# Patient Record
Sex: FEMALE | Race: BLACK OR AFRICAN AMERICAN | NOT HISPANIC OR LATINO | URBAN - METROPOLITAN AREA
[De-identification: names, ages, dates, MRNs, and addresses within clinical notes are randomized per-mention and may not be internally consistent; named-entity substitution may affect disease eponyms.]

---

## 2020-12-06 ENCOUNTER — INPATIENT (INPATIENT)
Facility: HOSPITAL | Age: 25
LOS: 5 days | Discharge: HOME | End: 2020-12-12
Attending: INTERNAL MEDICINE | Admitting: INTERNAL MEDICINE
Payer: MEDICAID

## 2020-12-06 ENCOUNTER — TRANSCRIPTION ENCOUNTER (OUTPATIENT)
Age: 25
End: 2020-12-06

## 2020-12-06 VITALS
TEMPERATURE: 99 F | SYSTOLIC BLOOD PRESSURE: 141 MMHG | RESPIRATION RATE: 16 BRPM | DIASTOLIC BLOOD PRESSURE: 83 MMHG | HEART RATE: 123 BPM | OXYGEN SATURATION: 99 %

## 2020-12-06 PROCEDURE — 99285 EMERGENCY DEPT VISIT HI MDM: CPT

## 2020-12-06 RX ORDER — SODIUM CHLORIDE 9 MG/ML
1000 INJECTION INTRAMUSCULAR; INTRAVENOUS; SUBCUTANEOUS ONCE
Refills: 0 | Status: COMPLETED | OUTPATIENT
Start: 2020-12-06 | End: 2020-12-06

## 2020-12-06 RX ADMIN — SODIUM CHLORIDE 1000 MILLILITER(S): 9 INJECTION INTRAMUSCULAR; INTRAVENOUS; SUBCUTANEOUS at 23:22

## 2020-12-06 NOTE — ED PROVIDER NOTE - ATTENDING CONTRIBUTION TO CARE
24 y/o female no pmhx presents with c/o sudden onset llq abdominal pain 3 hrs ago + took her temp and it was 102 so came to the ER, denies any headaches, neck pain, cough, n/v/d, chest pain, sob, any current abdominal pain, urinary symptoms, pelvic pain/discharge, numbness/weakness/tingling, recent travel, sick contact, social history. Took tylenol 2 hours ago with relief of fever.  Pt tachycardic, febrile to 100.2. Labs significant for elevated WBC 19.3, lactate 3.1. UA shows infection. Culture pending. Blood cultures drawn. CXR clear. low suspicion for COVID. Will send to CDU to observe overnight. Plan for abx, IVF, pain and fever control, repeat labs in morning.    I performed a history and physical exam of the patient and discussed their management with the advanced care provider. I reviewed the advanced care provider's note and agree with the documented findings and plan of care. My medical decision making and objective findings are found above.

## 2020-12-06 NOTE — ED ADULT NURSE NOTE - CHIEF COMPLAINT QUOTE
Patient c/o body aches, fevers ([id number]) and chills starting Saturday worsening today. Patient denies any shortness of breath, cough, loss of smell or taste. Patient reports intermittent pain to left lower quadrant. Patient denies any nausea, vomiting, diarrhea. Denies pain or difficulty urinating. LMP 11/18. Denies any past medical history. Last took ibuprofen 8:30pm.

## 2020-12-06 NOTE — ED ADULT NURSE NOTE - OBJECTIVE STATEMENT
Pt. presents to intake room 3, A&OX4, ambulatory. Pt. c/o fevers, bodyaches and chills. Pt. also states he had a sharp pain to the LLQ earlier tonight but the pain is intermittent and ha snow subsided. MD at bedside for evaluation. Respirations appear even and unlabored. 20 G IV established to L AC. Labs drawn and sent. Comfort measures in place. Will continue ot monitor.

## 2020-12-06 NOTE — ED PROVIDER NOTE - CLINICAL SUMMARY MEDICAL DECISION MAKING FREE TEXT BOX
abdominal pain, fever, afebrile, abdominal exam completely wnl, labs ua, ucg, fluids, covid, reasses

## 2020-12-06 NOTE — ED ADULT TRIAGE NOTE - CHIEF COMPLAINT QUOTE
Patient c/o body aches, fevers ([id number]) and chills starting Saturday worsening today. Patient denies any shortness of breath, cough, loss of smell or taste. Patient reports intermittent pain to left lower quadrant. Patient denies any nausea, vomiting, diarrhea. Denies pain or difficulty urinating. LMP 11/18. Denies any past medical history. Last took ibuprofen 8:30pm.

## 2020-12-06 NOTE — ED PROVIDER NOTE - OBJECTIVE STATEMENT
24 y/o female no pmh presents with c/o sudden onset llq abdominal pain 3 hrs ago + took her temp and it was 102 so came to the ER, denies any headaches, neck pain, cough, n/v/d, chest pain, sob, any current abdominal pain, urinary symptoms, pelvic pain/discharge, numbness/weakness/tingling, recent travel, sick contact, social history. Took tylenol 2 hours ago with relief of fever.

## 2020-12-07 DIAGNOSIS — N20.0 CALCULUS OF KIDNEY: ICD-10-CM

## 2020-12-07 LAB
ALBUMIN SERPL ELPH-MCNC: 4.1 G/DL — SIGNIFICANT CHANGE UP (ref 3.3–5)
ALP SERPL-CCNC: 111 U/L — SIGNIFICANT CHANGE UP (ref 40–120)
ALT FLD-CCNC: 19 U/L — SIGNIFICANT CHANGE UP (ref 4–33)
ANION GAP SERPL CALC-SCNC: 12 MMOL/L — SIGNIFICANT CHANGE UP (ref 7–14)
ANION GAP SERPL CALC-SCNC: 14 MMOL/L — SIGNIFICANT CHANGE UP (ref 7–14)
APPEARANCE UR: ABNORMAL
AST SERPL-CCNC: 26 U/L — SIGNIFICANT CHANGE UP (ref 4–32)
B PERT DNA SPEC QL NAA+PROBE: SIGNIFICANT CHANGE UP
BASE EXCESS BLDV CALC-SCNC: -1 MMOL/L — SIGNIFICANT CHANGE UP (ref -3–2)
BASOPHILS # BLD AUTO: 0.03 K/UL — SIGNIFICANT CHANGE UP (ref 0–0.2)
BASOPHILS # BLD AUTO: 0.03 K/UL — SIGNIFICANT CHANGE UP (ref 0–0.2)
BASOPHILS NFR BLD AUTO: 0.1 % — SIGNIFICANT CHANGE UP (ref 0–2)
BASOPHILS NFR BLD AUTO: 0.2 % — SIGNIFICANT CHANGE UP (ref 0–2)
BILIRUB SERPL-MCNC: 0.2 MG/DL — SIGNIFICANT CHANGE UP (ref 0.2–1.2)
BILIRUB UR-MCNC: NEGATIVE — SIGNIFICANT CHANGE UP
BLD GP AB SCN SERPL QL: NEGATIVE — SIGNIFICANT CHANGE UP
BLOOD GAS VENOUS - CREATININE: 1 MG/DL — SIGNIFICANT CHANGE UP (ref 0.5–1.3)
BLOOD GAS VENOUS COMPREHENSIVE RESULT: SIGNIFICANT CHANGE UP
BUN SERPL-MCNC: 10 MG/DL — SIGNIFICANT CHANGE UP (ref 7–23)
BUN SERPL-MCNC: 12 MG/DL — SIGNIFICANT CHANGE UP (ref 7–23)
C PNEUM DNA SPEC QL NAA+PROBE: SIGNIFICANT CHANGE UP
CALCIUM SERPL-MCNC: 8.6 MG/DL — SIGNIFICANT CHANGE UP (ref 8.4–10.5)
CALCIUM SERPL-MCNC: 9.4 MG/DL — SIGNIFICANT CHANGE UP (ref 8.4–10.5)
CHLORIDE BLDV-SCNC: 109 MMOL/L — HIGH (ref 96–108)
CHLORIDE SERPL-SCNC: 100 MMOL/L — SIGNIFICANT CHANGE UP (ref 98–107)
CHLORIDE SERPL-SCNC: 103 MMOL/L — SIGNIFICANT CHANGE UP (ref 98–107)
CO2 SERPL-SCNC: 23 MMOL/L — SIGNIFICANT CHANGE UP (ref 22–31)
CO2 SERPL-SCNC: 24 MMOL/L — SIGNIFICANT CHANGE UP (ref 22–31)
COLOR SPEC: YELLOW — SIGNIFICANT CHANGE UP
CREAT SERPL-MCNC: 0.96 MG/DL — SIGNIFICANT CHANGE UP (ref 0.5–1.3)
CREAT SERPL-MCNC: 0.97 MG/DL — SIGNIFICANT CHANGE UP (ref 0.5–1.3)
DIFF PNL FLD: ABNORMAL
EOSINOPHIL # BLD AUTO: 0 K/UL — SIGNIFICANT CHANGE UP (ref 0–0.5)
EOSINOPHIL # BLD AUTO: 0.02 K/UL — SIGNIFICANT CHANGE UP (ref 0–0.5)
EOSINOPHIL NFR BLD AUTO: 0 % — SIGNIFICANT CHANGE UP (ref 0–6)
EOSINOPHIL NFR BLD AUTO: 0.1 % — SIGNIFICANT CHANGE UP (ref 0–6)
FLUAV H1 2009 PAND RNA SPEC QL NAA+PROBE: SIGNIFICANT CHANGE UP
FLUAV H1 RNA SPEC QL NAA+PROBE: SIGNIFICANT CHANGE UP
FLUAV H3 RNA SPEC QL NAA+PROBE: SIGNIFICANT CHANGE UP
FLUAV SUBTYP SPEC NAA+PROBE: SIGNIFICANT CHANGE UP
FLUBV RNA SPEC QL NAA+PROBE: SIGNIFICANT CHANGE UP
GAS PNL BLDV: 139 MMOL/L — SIGNIFICANT CHANGE UP (ref 136–146)
GLUCOSE BLDV-MCNC: 101 MG/DL — HIGH (ref 70–99)
GLUCOSE SERPL-MCNC: 103 MG/DL — HIGH (ref 70–99)
GLUCOSE SERPL-MCNC: 137 MG/DL — HIGH (ref 70–99)
GLUCOSE UR QL: NEGATIVE — SIGNIFICANT CHANGE UP
HADV DNA SPEC QL NAA+PROBE: SIGNIFICANT CHANGE UP
HCO3 BLDV-SCNC: 22 MMOL/L — SIGNIFICANT CHANGE UP (ref 20–27)
HCOV PNL SPEC NAA+PROBE: SIGNIFICANT CHANGE UP
HCT VFR BLD CALC: 35.2 % — SIGNIFICANT CHANGE UP (ref 34.5–45)
HCT VFR BLD CALC: 38.4 % — SIGNIFICANT CHANGE UP (ref 34.5–45)
HCT VFR BLD CALC: 41 % — SIGNIFICANT CHANGE UP (ref 34.5–45)
HCT VFR BLDA CALC: 39.6 % — SIGNIFICANT CHANGE UP (ref 34.5–46.5)
HGB BLD CALC-MCNC: 12.9 G/DL — SIGNIFICANT CHANGE UP (ref 11.5–15.5)
HGB BLD-MCNC: 11 G/DL — LOW (ref 11.5–15.5)
HGB BLD-MCNC: 11.9 G/DL — SIGNIFICANT CHANGE UP (ref 11.5–15.5)
HGB BLD-MCNC: 12.6 G/DL — SIGNIFICANT CHANGE UP (ref 11.5–15.5)
HMPV RNA SPEC QL NAA+PROBE: SIGNIFICANT CHANGE UP
HPIV1 RNA SPEC QL NAA+PROBE: SIGNIFICANT CHANGE UP
HPIV2 RNA SPEC QL NAA+PROBE: SIGNIFICANT CHANGE UP
HPIV3 RNA SPEC QL NAA+PROBE: SIGNIFICANT CHANGE UP
HPIV4 RNA SPEC QL NAA+PROBE: SIGNIFICANT CHANGE UP
IANC: 16.52 K/UL — HIGH (ref 1.5–8.5)
IANC: 21.55 K/UL — HIGH (ref 1.5–8.5)
IMM GRANULOCYTES NFR BLD AUTO: 0.5 % — SIGNIFICANT CHANGE UP (ref 0–1.5)
IMM GRANULOCYTES NFR BLD AUTO: 0.7 % — SIGNIFICANT CHANGE UP (ref 0–1.5)
INR BLD: 1.51 RATIO — HIGH (ref 0.88–1.17)
KETONES UR-MCNC: NEGATIVE — SIGNIFICANT CHANGE UP
LACTATE BLDV-MCNC: 2.1 MMOL/L — HIGH (ref 0.5–2)
LACTATE BLDV-MCNC: 2.8 MMOL/L — HIGH (ref 0.5–2)
LEUKOCYTE ESTERASE UR-ACNC: ABNORMAL
LYMPHOCYTES # BLD AUTO: 1.2 K/UL — SIGNIFICANT CHANGE UP (ref 1–3.3)
LYMPHOCYTES # BLD AUTO: 1.22 K/UL — SIGNIFICANT CHANGE UP (ref 1–3.3)
LYMPHOCYTES # BLD AUTO: 4.8 % — LOW (ref 13–44)
LYMPHOCYTES # BLD AUTO: 6.3 % — LOW (ref 13–44)
MCHC RBC-ENTMCNC: 27.2 PG — SIGNIFICANT CHANGE UP (ref 27–34)
MCHC RBC-ENTMCNC: 27.4 PG — SIGNIFICANT CHANGE UP (ref 27–34)
MCHC RBC-ENTMCNC: 27.6 PG — SIGNIFICANT CHANGE UP (ref 27–34)
MCHC RBC-ENTMCNC: 30.7 GM/DL — LOW (ref 32–36)
MCHC RBC-ENTMCNC: 31 GM/DL — LOW (ref 32–36)
MCHC RBC-ENTMCNC: 31.3 GM/DL — LOW (ref 32–36)
MCV RBC AUTO: 88.4 FL — SIGNIFICANT CHANGE UP (ref 80–100)
MCV RBC AUTO: 88.5 FL — SIGNIFICANT CHANGE UP (ref 80–100)
MCV RBC AUTO: 88.6 FL — SIGNIFICANT CHANGE UP (ref 80–100)
MONOCYTES # BLD AUTO: 1.46 K/UL — HIGH (ref 0–0.9)
MONOCYTES # BLD AUTO: 1.84 K/UL — HIGH (ref 0–0.9)
MONOCYTES NFR BLD AUTO: 7.4 % — SIGNIFICANT CHANGE UP (ref 2–14)
MONOCYTES NFR BLD AUTO: 7.5 % — SIGNIFICANT CHANGE UP (ref 2–14)
NEUTROPHILS # BLD AUTO: 16.52 K/UL — HIGH (ref 1.8–7.4)
NEUTROPHILS # BLD AUTO: 21.55 K/UL — HIGH (ref 1.8–7.4)
NEUTROPHILS NFR BLD AUTO: 85.4 % — HIGH (ref 43–77)
NEUTROPHILS NFR BLD AUTO: 87 % — HIGH (ref 43–77)
NITRITE UR-MCNC: NEGATIVE — SIGNIFICANT CHANGE UP
NRBC # BLD: 0 /100 WBCS — SIGNIFICANT CHANGE UP
NRBC # FLD: 0 K/UL — SIGNIFICANT CHANGE UP
PCO2 BLDV: 52 MMHG — HIGH (ref 41–51)
PH BLDV: 7.3 — LOW (ref 7.32–7.43)
PH UR: 7.5 — SIGNIFICANT CHANGE UP (ref 5–8)
PLATELET # BLD AUTO: 263 K/UL — SIGNIFICANT CHANGE UP (ref 150–400)
PLATELET # BLD AUTO: 278 K/UL — SIGNIFICANT CHANGE UP (ref 150–400)
PLATELET # BLD AUTO: 314 K/UL — SIGNIFICANT CHANGE UP (ref 150–400)
PO2 BLDV: 30 MMHG — LOW (ref 35–40)
POTASSIUM BLDV-SCNC: 3.6 MMOL/L — SIGNIFICANT CHANGE UP (ref 3.4–4.5)
POTASSIUM SERPL-MCNC: 3.7 MMOL/L — SIGNIFICANT CHANGE UP (ref 3.5–5.3)
POTASSIUM SERPL-MCNC: 3.7 MMOL/L — SIGNIFICANT CHANGE UP (ref 3.5–5.3)
POTASSIUM SERPL-SCNC: 3.7 MMOL/L — SIGNIFICANT CHANGE UP (ref 3.5–5.3)
POTASSIUM SERPL-SCNC: 3.7 MMOL/L — SIGNIFICANT CHANGE UP (ref 3.5–5.3)
PROT SERPL-MCNC: 7.1 G/DL — SIGNIFICANT CHANGE UP (ref 6–8.3)
PROT UR-MCNC: ABNORMAL
PROTHROM AB SERPL-ACNC: 16.9 SEC — HIGH (ref 9.8–13.1)
RAPID RVP RESULT: SIGNIFICANT CHANGE UP
RBC # BLD: 3.98 M/UL — SIGNIFICANT CHANGE UP (ref 3.8–5.2)
RBC # BLD: 4.34 M/UL — SIGNIFICANT CHANGE UP (ref 3.8–5.2)
RBC # BLD: 4.63 M/UL — SIGNIFICANT CHANGE UP (ref 3.8–5.2)
RBC # FLD: 12.4 % — SIGNIFICANT CHANGE UP (ref 10.3–14.5)
RBC # FLD: 12.6 % — SIGNIFICANT CHANGE UP (ref 10.3–14.5)
RBC # FLD: 12.8 % — SIGNIFICANT CHANGE UP (ref 10.3–14.5)
RH IG SCN BLD-IMP: POSITIVE — SIGNIFICANT CHANGE UP
RSV RNA SPEC QL NAA+PROBE: SIGNIFICANT CHANGE UP
RV+EV RNA SPEC QL NAA+PROBE: SIGNIFICANT CHANGE UP
SAO2 % BLDV: 41.4 % — LOW (ref 60–85)
SARS-COV-2 RNA SPEC QL NAA+PROBE: SIGNIFICANT CHANGE UP
SARS-COV-2 RNA SPEC QL NAA+PROBE: SIGNIFICANT CHANGE UP
SODIUM SERPL-SCNC: 137 MMOL/L — SIGNIFICANT CHANGE UP (ref 135–145)
SODIUM SERPL-SCNC: 139 MMOL/L — SIGNIFICANT CHANGE UP (ref 135–145)
SP GR SPEC: 1.02 — SIGNIFICANT CHANGE UP (ref 1.01–1.02)
UROBILINOGEN FLD QL: SIGNIFICANT CHANGE UP
WBC # BLD: 19.34 K/UL — HIGH (ref 3.8–10.5)
WBC # BLD: 24.8 K/UL — HIGH (ref 3.8–10.5)
WBC # BLD: 31.16 K/UL — HIGH (ref 3.8–10.5)
WBC # FLD AUTO: 19.34 K/UL — HIGH (ref 3.8–10.5)
WBC # FLD AUTO: 24.8 K/UL — HIGH (ref 3.8–10.5)
WBC # FLD AUTO: 31.16 K/UL — HIGH (ref 3.8–10.5)

## 2020-12-07 PROCEDURE — 52332 CYSTOSCOPY AND TREATMENT: CPT | Mod: LT

## 2020-12-07 PROCEDURE — 74177 CT ABD & PELVIS W/CONTRAST: CPT | Mod: 26

## 2020-12-07 PROCEDURE — 71046 X-RAY EXAM CHEST 2 VIEWS: CPT | Mod: 26

## 2020-12-07 PROCEDURE — 99236 HOSP IP/OBS SAME DATE HI 85: CPT

## 2020-12-07 PROCEDURE — 93010 ELECTROCARDIOGRAM REPORT: CPT

## 2020-12-07 RX ORDER — ACETAMINOPHEN 500 MG
650 TABLET ORAL ONCE
Refills: 0 | Status: COMPLETED | OUTPATIENT
Start: 2020-12-07 | End: 2020-12-07

## 2020-12-07 RX ORDER — ATROPA BELLADONNA AND OPIUM 16.2; 6 MG/1; MG/1
1 SUPPOSITORY RECTAL ONCE
Refills: 0 | Status: DISCONTINUED | OUTPATIENT
Start: 2020-12-07 | End: 2020-12-07

## 2020-12-07 RX ORDER — VANCOMYCIN HCL 1 G
1000 VIAL (EA) INTRAVENOUS ONCE
Refills: 0 | Status: COMPLETED | OUTPATIENT
Start: 2020-12-07 | End: 2020-12-07

## 2020-12-07 RX ORDER — PIPERACILLIN AND TAZOBACTAM 4; .5 G/20ML; G/20ML
3.38 INJECTION, POWDER, LYOPHILIZED, FOR SOLUTION INTRAVENOUS ONCE
Refills: 0 | Status: COMPLETED | OUTPATIENT
Start: 2020-12-07 | End: 2020-12-07

## 2020-12-07 RX ORDER — FENTANYL CITRATE 50 UG/ML
25 INJECTION INTRAVENOUS
Refills: 0 | Status: DISCONTINUED | OUTPATIENT
Start: 2020-12-07 | End: 2020-12-07

## 2020-12-07 RX ORDER — SENNA PLUS 8.6 MG/1
2 TABLET ORAL AT BEDTIME
Refills: 0 | Status: DISCONTINUED | OUTPATIENT
Start: 2020-12-07 | End: 2020-12-12

## 2020-12-07 RX ORDER — ONDANSETRON 8 MG/1
4 TABLET, FILM COATED ORAL ONCE
Refills: 0 | Status: DISCONTINUED | OUTPATIENT
Start: 2020-12-07 | End: 2020-12-07

## 2020-12-07 RX ORDER — POLYETHYLENE GLYCOL 3350 17 G/17G
17 POWDER, FOR SOLUTION ORAL DAILY
Refills: 0 | Status: DISCONTINUED | OUTPATIENT
Start: 2020-12-07 | End: 2020-12-12

## 2020-12-07 RX ORDER — KETOROLAC TROMETHAMINE 30 MG/ML
30 SYRINGE (ML) INJECTION EVERY 6 HOURS
Refills: 0 | Status: DISCONTINUED | OUTPATIENT
Start: 2020-12-07 | End: 2020-12-07

## 2020-12-07 RX ORDER — ACETAMINOPHEN 500 MG
975 TABLET ORAL EVERY 6 HOURS
Refills: 0 | Status: DISCONTINUED | OUTPATIENT
Start: 2020-12-07 | End: 2020-12-07

## 2020-12-07 RX ORDER — SODIUM CHLORIDE 9 MG/ML
1000 INJECTION INTRAMUSCULAR; INTRAVENOUS; SUBCUTANEOUS
Refills: 0 | Status: DISCONTINUED | OUTPATIENT
Start: 2020-12-07 | End: 2020-12-07

## 2020-12-07 RX ORDER — CEFTRIAXONE 500 MG/1
1000 INJECTION, POWDER, FOR SOLUTION INTRAMUSCULAR; INTRAVENOUS ONCE
Refills: 0 | Status: COMPLETED | OUTPATIENT
Start: 2020-12-07 | End: 2020-12-07

## 2020-12-07 RX ORDER — CEFTRIAXONE 500 MG/1
1000 INJECTION, POWDER, FOR SOLUTION INTRAMUSCULAR; INTRAVENOUS EVERY 24 HOURS
Refills: 0 | Status: DISCONTINUED | OUTPATIENT
Start: 2020-12-07 | End: 2020-12-08

## 2020-12-07 RX ORDER — ACETAMINOPHEN 500 MG
650 TABLET ORAL EVERY 6 HOURS
Refills: 0 | Status: DISCONTINUED | OUTPATIENT
Start: 2020-12-07 | End: 2020-12-12

## 2020-12-07 RX ORDER — TAMSULOSIN HYDROCHLORIDE 0.4 MG/1
0.4 CAPSULE ORAL AT BEDTIME
Refills: 0 | Status: DISCONTINUED | OUTPATIENT
Start: 2020-12-07 | End: 2020-12-12

## 2020-12-07 RX ORDER — ONDANSETRON 8 MG/1
4 TABLET, FILM COATED ORAL ONCE
Refills: 0 | Status: COMPLETED | OUTPATIENT
Start: 2020-12-07 | End: 2020-12-07

## 2020-12-07 RX ORDER — HEPARIN SODIUM 5000 [USP'U]/ML
5000 INJECTION INTRAVENOUS; SUBCUTANEOUS EVERY 8 HOURS
Refills: 0 | Status: DISCONTINUED | OUTPATIENT
Start: 2020-12-07 | End: 2020-12-12

## 2020-12-07 RX ORDER — CEFTRIAXONE 500 MG/1
1000 INJECTION, POWDER, FOR SOLUTION INTRAMUSCULAR; INTRAVENOUS EVERY 24 HOURS
Refills: 0 | Status: DISCONTINUED | OUTPATIENT
Start: 2020-12-07 | End: 2020-12-07

## 2020-12-07 RX ORDER — SODIUM CHLORIDE 9 MG/ML
1000 INJECTION INTRAMUSCULAR; INTRAVENOUS; SUBCUTANEOUS ONCE
Refills: 0 | Status: COMPLETED | OUTPATIENT
Start: 2020-12-07 | End: 2020-12-07

## 2020-12-07 RX ORDER — SODIUM CHLORIDE 9 MG/ML
1000 INJECTION, SOLUTION INTRAVENOUS
Refills: 0 | Status: DISCONTINUED | OUTPATIENT
Start: 2020-12-07 | End: 2020-12-08

## 2020-12-07 RX ADMIN — ATROPA BELLADONNA AND OPIUM 1 SUPPOSITORY(S): 16.2; 6 SUPPOSITORY RECTAL at 22:30

## 2020-12-07 RX ADMIN — ONDANSETRON 4 MILLIGRAM(S): 8 TABLET, FILM COATED ORAL at 07:30

## 2020-12-07 RX ADMIN — Medication 30 MILLIGRAM(S): at 10:53

## 2020-12-07 RX ADMIN — HEPARIN SODIUM 5000 UNIT(S): 5000 INJECTION INTRAVENOUS; SUBCUTANEOUS at 21:51

## 2020-12-07 RX ADMIN — Medication 650 MILLIGRAM(S): at 02:01

## 2020-12-07 RX ADMIN — CEFTRIAXONE 100 MILLIGRAM(S): 500 INJECTION, POWDER, FOR SOLUTION INTRAMUSCULAR; INTRAVENOUS at 17:51

## 2020-12-07 RX ADMIN — ATROPA BELLADONNA AND OPIUM 1 SUPPOSITORY(S): 16.2; 6 SUPPOSITORY RECTAL at 21:51

## 2020-12-07 RX ADMIN — PIPERACILLIN AND TAZOBACTAM 200 GRAM(S): 4; .5 INJECTION, POWDER, LYOPHILIZED, FOR SOLUTION INTRAVENOUS at 14:49

## 2020-12-07 RX ADMIN — SENNA PLUS 2 TABLET(S): 8.6 TABLET ORAL at 21:51

## 2020-12-07 RX ADMIN — Medication 250 MILLIGRAM(S): at 15:30

## 2020-12-07 RX ADMIN — TAMSULOSIN HYDROCHLORIDE 0.4 MILLIGRAM(S): 0.4 CAPSULE ORAL at 21:50

## 2020-12-07 RX ADMIN — Medication 975 MILLIGRAM(S): at 09:10

## 2020-12-07 RX ADMIN — Medication 650 MILLIGRAM(S): at 20:59

## 2020-12-07 RX ADMIN — SODIUM CHLORIDE 1000 MILLILITER(S): 9 INJECTION INTRAMUSCULAR; INTRAVENOUS; SUBCUTANEOUS at 01:09

## 2020-12-07 RX ADMIN — SODIUM CHLORIDE 1000 MILLILITER(S): 9 INJECTION INTRAMUSCULAR; INTRAVENOUS; SUBCUTANEOUS at 14:48

## 2020-12-07 RX ADMIN — SODIUM CHLORIDE 150 MILLILITER(S): 9 INJECTION INTRAMUSCULAR; INTRAVENOUS; SUBCUTANEOUS at 05:33

## 2020-12-07 RX ADMIN — SODIUM CHLORIDE 150 MILLILITER(S): 9 INJECTION INTRAMUSCULAR; INTRAVENOUS; SUBCUTANEOUS at 12:00

## 2020-12-07 RX ADMIN — Medication 650 MILLIGRAM(S): at 21:40

## 2020-12-07 RX ADMIN — CEFTRIAXONE 100 MILLIGRAM(S): 500 INJECTION, POWDER, FOR SOLUTION INTRAMUSCULAR; INTRAVENOUS at 01:42

## 2020-12-07 RX ADMIN — Medication 650 MILLIGRAM(S): at 05:33

## 2020-12-07 RX ADMIN — Medication 650 MILLIGRAM(S): at 13:11

## 2020-12-07 NOTE — ED CDU PROVIDER INITIAL DAY NOTE - OBJECTIVE STATEMENT
26 y/o female no pmh presents with c/o sudden onset llq abdominal pain 3 hrs ago + took her temp and it was 102 so came to the ER, denies any headaches, neck pain, cough, n/v/d, chest pain, sob, any current abdominal pain, urinary symptoms, pelvic pain/discharge, numbness/weakness/tingling, recent travel, sick contact, social history. Took tylenol 2 hours ago with relief of fever.  Pt tachycardic, febrile to 100.2. Labs significant for elevated WBC 19.3, lactate 3.1. UA shows infection. Culture pending. Blood cultures drawn. CXR clear. low suspicion for COVID. Likely pyelonephritis. Will observe overnight in CDU. Plan for abx, IVF, pain and fever control, repeat labs in morning. 24 y/o female no pmh presents with c/o sudden onset llq abdominal pain 3 hrs ago + took her temp and it was 102 so came to the ER, denies any headaches, neck pain, cough, n/v/d, chest pain, sob, any current abdominal pain, urinary symptoms, pelvic pain/discharge, numbness/weakness/tingling, recent travel, sick contact, social history. Took tylenol 2 hours ago with relief of fever.  Pt tachycardic, febrile to 100.2. Labs significant for elevated WBC 19.3, lactate 3.1. UA shows infection. Culture pending. Blood cultures drawn. CXR clear. low suspicion for COVID. Will observe overnight in CDU. Plan for abx, IVF, pain and fever control, repeat labs in morning.

## 2020-12-07 NOTE — ED CDU PROVIDER INITIAL DAY NOTE - MEDICAL DECISION MAKING DETAILS
UTI likely pyelonephritis.   plan  - abx, ivf, anti-pyretics, pain control, repeat labs. UTI c/b sepsis.   plan  - abx, ivf, anti-pyretics, pain control, repeat labs.

## 2020-12-07 NOTE — ED CDU PROVIDER INITIAL DAY NOTE - FAMILY HISTORY
Aunt  Still living? Yes, Estimated age: 41-50  Family history of tonsillitis, Age at diagnosis: Age Unknown

## 2020-12-07 NOTE — PROGRESS NOTE ADULT - SUBJECTIVE AND OBJECTIVE BOX
Subjective  Patient is complaining of back pain, no nausea, no vomiting    Objective    Vital signs  T(F): , Max: 102.2 (12-07-20 @ 14:30)  HR: 109 (12-07-20 @ 19:30)  BP: 111/71 (12-07-20 @ 19:30)  SpO2: 96% (12-07-20 @ 19:30)  Wt(kg): --    Output     12-06 @ 07:01  -  12-07 @ 07:00  --------------------------------------------------------  IN: 2000 mL / OUT: 0 mL / NET: 2000 mL    12-07 @ 07:01  -  12-07 @ 20:54  --------------------------------------------------------  IN: 2145 mL / OUT: 235 mL / NET: 1910 mL        Gen: No distress observed  Abd: soft, no CVA tenderness  : + shaffer, clear urine    Labs      12-07 @ 13:39    WBC 31.16 / Hct 35.2  / SCr --       12-07 @ 06:45    WBC 24.80 / Hct 38.4  / SCr 0.96       Urine Cx: ?  Blood Cx: ?    Imaging

## 2020-12-07 NOTE — ED CDU PROVIDER INITIAL DAY NOTE - PROGRESS NOTE DETAILS
SHLOMO Mosley: pt resting comfortably in bed NAD, pt states she feels better, denies abdominal pain.  Abdomen is soft non tender, pt receiving IVF, IV antibiotics, repeat labs. PT well appearing, mentating and denies pain complaints, states she feels well.  Abdomen soft, without any tenderness.  No TTP to bilateral CVA/bilateral lumbar triangles.  Pt states that current sxs began with sharp pain to lower abdomen and now cramping pain.  Despite non-tender exam, pt tachycardic with recurrent fevers, elevating WBC.  Will obtain CTAP to confirm she does not have divertic behind bladder/ruptured appy.  Saline bolus administered following recurrent tachycardia.  CT scan ordered on paper order 2/2 partial sunrise downtime.  PT amenable to plan. SHLOMO Mosley: CT obtained showing "Left-sided obstructive uropathy with mild to moderate left hydroureteronephrosis to the level of a 6 mm mid ureteral stone." Urology called for consult and admission.  Pt receiving IVF, Vancomycin and Zosyn ordered.  Will admit. SHLOMO Mosley: CT obtained showing "Left-sided obstructive uropathy with mild to moderate left hydroureteronephrosis to the level of a 6 mm mid ureteral stone." Urology called for consult and admission.  Pt receiving IVF, Vancomycin and Zosyn ordered.  Pt denies any pain, denies hx of stone.  Pt resting comfortably NAD.  Will admit. SHLOMO Mosley: pt seen and evaluated by Urology advised to admit to Urology Dr. Fields.  Pt advised of plan for admission.

## 2020-12-07 NOTE — H&P ADULT - HISTORY OF PRESENT ILLNESS
Ms. Adams is a 25yof with no significant PMH who presented with fever and LLQ abdominal pain.  Pain began on Saturday with sudden onset.  It was sharp in intensity and associated with nausea but no vomiting.  Yesterday evening additionally developed fever and for this went to the emergency room.  Upon presentation, no longer was experiencing pain.  UA demosntrated large leuks and >50 WBC's, but no nitrites.  WBC was 19.34.  Cr of 0.97, Lactate of 3.1.  Folowing AM pt became tachycardic with worsening leukocytosis and so a CTAP was obtained, demonstrating a 7mm left midureteral stone.

## 2020-12-07 NOTE — H&P ADULT - ASSESSMENT
25yof with a septic left kidney stone.    -NPO IVF  - S/p 4L in boluses total  - Receiving Vanc/zosyn  - Urine and blood cx pending  - Plan for OR emergently

## 2020-12-07 NOTE — ED CDU PROVIDER DISPOSITION NOTE - ATTENDING CONTRIBUTION TO CARE
Attending MD Haile.  Pt seen by me in CDU/obs unit on morning of 12/7.  Pt is a 24 yo female with no sig pmhx who has developed intermittent sharp lower abdominal pain assoc with fevers/chills.  Pt was concerned she had COVID.  PT has a benign abdominal exam.  Pt well appearing and appears non-toxic.  Pt found to have a UTI.  WBC 19-->24.  Pt having fevers in ED.  COVID/RVP negative as well as CXR.  Pt denies vaginal discharge and had a non-actionable pelvic exam on arrival to ED.  Denies travel or known sick contacts.  UA c/w UTI though pt denies dysuria or inc urinary frequency.  Pt very well appearing but WBC elevating.  PT received single dose ceftriaxone so far.  Abdominal exam benign without focal TTP/flank discomfort. Planned continued obs in CDU and repeat WBC/VBG at noon to assess for improvement.  Temp 100.1 this morning.  Pt endorsing improvement in abdominal discomfort and states that she currently has no pain and feels well.  Labs not corresponding with pt appearance.  Will continue to improvement to confirm they begin to resolve. Attending MD Haile.  Pt seen by me in CDU/obs unit on morning of 12/7.  Pt is a 24 yo female with no sig pmhx who has developed intermittent sharp lower abdominal pain assoc with fevers/chills.  Pt was concerned she had COVID.  PT has a benign abdominal exam.  Pt well appearing and appears non-toxic.  Pt found to have a UTI.  WBC 19-->24.  Pt having fevers in ED.  COVID/RVP negative as well as CXR.  Pt denies vaginal discharge and had a non-actionable pelvic exam on arrival to ED.  Denies travel or known sick contacts.  UA c/w UTI though pt denies dysuria or inc urinary frequency.  Pt very well appearing but WBC elevating.  PT received single dose ceftriaxone so far.  Abdominal exam benign without focal TTP/flank discomfort. Planned continued obs in CDU and repeat WBC/VBG at noon to assess for improvement.  Temp 100.1 this morning.  Pt endorsing improvement in abdominal discomfort and states that she currently has no pain and feels well.  Labs not corresponding with pt appearance.  Will continue to improvement to confirm they begin to resolve.    Pt's WBC has continued to rise and pt has continued to spike fevers despite UA c/w UTI and appropriate abx for uncomplicated UTI in otherwise healthy young woman.  Fluids have been bolused and administered as maintenance.  Pt continues to deny pain, denies sig sxs but has intermittent rigors.  She is well appearing and otherwise mentating in NAD.  CTAP ordered to assess for underlying more complicated pathology than is readily apparent on exam.  Pt broadened to broad spectrum abxs for broader sepsis coverage in case source is more complicated than simple UTI.  Pt found to have infected L mid ureteral stone.  Urology called for consult and to take pt to OR.  Pt made NPO.  Pt admitted for infected L ureteral stone with bill sepsis.

## 2020-12-08 DIAGNOSIS — A41.9 SEPSIS, UNSPECIFIED ORGANISM: ICD-10-CM

## 2020-12-08 DIAGNOSIS — N39.0 URINARY TRACT INFECTION, SITE NOT SPECIFIED: ICD-10-CM

## 2020-12-08 LAB
ANION GAP SERPL CALC-SCNC: 11 MMOL/L — SIGNIFICANT CHANGE UP (ref 7–14)
BUN SERPL-MCNC: 9 MG/DL — SIGNIFICANT CHANGE UP (ref 7–23)
CALCIUM SERPL-MCNC: 8.2 MG/DL — LOW (ref 8.4–10.5)
CHLORIDE SERPL-SCNC: 106 MMOL/L — SIGNIFICANT CHANGE UP (ref 98–107)
CO2 SERPL-SCNC: 21 MMOL/L — LOW (ref 22–31)
CREAT SERPL-MCNC: 0.82 MG/DL — SIGNIFICANT CHANGE UP (ref 0.5–1.3)
GLUCOSE SERPL-MCNC: 111 MG/DL — HIGH (ref 70–99)
HCT VFR BLD CALC: 34.4 % — LOW (ref 34.5–45)
HGB BLD-MCNC: 10.6 G/DL — LOW (ref 11.5–15.5)
LACTATE SERPL-SCNC: 3.4 MMOL/L — HIGH (ref 0.5–2)
MCHC RBC-ENTMCNC: 27.4 PG — SIGNIFICANT CHANGE UP (ref 27–34)
MCHC RBC-ENTMCNC: 30.8 GM/DL — LOW (ref 32–36)
MCV RBC AUTO: 88.9 FL — SIGNIFICANT CHANGE UP (ref 80–100)
NRBC # BLD: 0 /100 WBCS — SIGNIFICANT CHANGE UP
NRBC # FLD: 0 K/UL — SIGNIFICANT CHANGE UP
PLATELET # BLD AUTO: 245 K/UL — SIGNIFICANT CHANGE UP (ref 150–400)
POTASSIUM SERPL-MCNC: 3.8 MMOL/L — SIGNIFICANT CHANGE UP (ref 3.5–5.3)
POTASSIUM SERPL-SCNC: 3.8 MMOL/L — SIGNIFICANT CHANGE UP (ref 3.5–5.3)
RBC # BLD: 3.87 M/UL — SIGNIFICANT CHANGE UP (ref 3.8–5.2)
RBC # FLD: 13.1 % — SIGNIFICANT CHANGE UP (ref 10.3–14.5)
SARS-COV-2 RNA SPEC QL NAA+PROBE: SIGNIFICANT CHANGE UP
SODIUM SERPL-SCNC: 138 MMOL/L — SIGNIFICANT CHANGE UP (ref 135–145)
WBC # BLD: 31.81 K/UL — HIGH (ref 3.8–10.5)
WBC # FLD AUTO: 31.81 K/UL — HIGH (ref 3.8–10.5)

## 2020-12-08 PROCEDURE — 71275 CT ANGIOGRAPHY CHEST: CPT | Mod: 26

## 2020-12-08 PROCEDURE — 99223 1ST HOSP IP/OBS HIGH 75: CPT

## 2020-12-08 RX ORDER — MEROPENEM 1 G/30ML
1000 INJECTION INTRAVENOUS EVERY 8 HOURS
Refills: 0 | Status: DISCONTINUED | OUTPATIENT
Start: 2020-12-08 | End: 2020-12-09

## 2020-12-08 RX ORDER — INFLUENZA VIRUS VACCINE 15; 15; 15; 15 UG/.5ML; UG/.5ML; UG/.5ML; UG/.5ML
0.5 SUSPENSION INTRAMUSCULAR ONCE
Refills: 0 | Status: DISCONTINUED | OUTPATIENT
Start: 2020-12-08 | End: 2020-12-12

## 2020-12-08 RX ORDER — MEROPENEM 1 G/30ML
1000 INJECTION INTRAVENOUS ONCE
Refills: 0 | Status: COMPLETED | OUTPATIENT
Start: 2020-12-08 | End: 2020-12-08

## 2020-12-08 RX ORDER — IBUPROFEN 200 MG
400 TABLET ORAL ONCE
Refills: 0 | Status: COMPLETED | OUTPATIENT
Start: 2020-12-08 | End: 2020-12-08

## 2020-12-08 RX ORDER — SODIUM CHLORIDE 9 MG/ML
1000 INJECTION, SOLUTION INTRAVENOUS
Refills: 0 | Status: DISCONTINUED | OUTPATIENT
Start: 2020-12-08 | End: 2020-12-10

## 2020-12-08 RX ORDER — MEROPENEM 1 G/30ML
INJECTION INTRAVENOUS
Refills: 0 | Status: DISCONTINUED | OUTPATIENT
Start: 2020-12-08 | End: 2020-12-09

## 2020-12-08 RX ORDER — SODIUM CHLORIDE 9 MG/ML
1000 INJECTION, SOLUTION INTRAVENOUS ONCE
Refills: 0 | Status: COMPLETED | OUTPATIENT
Start: 2020-12-08 | End: 2020-12-08

## 2020-12-08 RX ORDER — SODIUM CHLORIDE 9 MG/ML
1000 INJECTION, SOLUTION INTRAVENOUS
Refills: 0 | Status: DISCONTINUED | OUTPATIENT
Start: 2020-12-08 | End: 2020-12-08

## 2020-12-08 RX ORDER — PANTOPRAZOLE SODIUM 20 MG/1
40 TABLET, DELAYED RELEASE ORAL
Refills: 0 | Status: DISCONTINUED | OUTPATIENT
Start: 2020-12-08 | End: 2020-12-12

## 2020-12-08 RX ADMIN — Medication 650 MILLIGRAM(S): at 23:27

## 2020-12-08 RX ADMIN — MEROPENEM 100 MILLIGRAM(S): 1 INJECTION INTRAVENOUS at 22:08

## 2020-12-08 RX ADMIN — PANTOPRAZOLE SODIUM 40 MILLIGRAM(S): 20 TABLET, DELAYED RELEASE ORAL at 08:40

## 2020-12-08 RX ADMIN — HEPARIN SODIUM 5000 UNIT(S): 5000 INJECTION INTRAVENOUS; SUBCUTANEOUS at 13:08

## 2020-12-08 RX ADMIN — SODIUM CHLORIDE 125 MILLILITER(S): 9 INJECTION, SOLUTION INTRAVENOUS at 22:09

## 2020-12-08 RX ADMIN — MEROPENEM 100 MILLIGRAM(S): 1 INJECTION INTRAVENOUS at 14:07

## 2020-12-08 RX ADMIN — TAMSULOSIN HYDROCHLORIDE 0.4 MILLIGRAM(S): 0.4 CAPSULE ORAL at 22:09

## 2020-12-08 RX ADMIN — Medication 650 MILLIGRAM(S): at 05:05

## 2020-12-08 RX ADMIN — HEPARIN SODIUM 5000 UNIT(S): 5000 INJECTION INTRAVENOUS; SUBCUTANEOUS at 05:05

## 2020-12-08 RX ADMIN — HEPARIN SODIUM 5000 UNIT(S): 5000 INJECTION INTRAVENOUS; SUBCUTANEOUS at 22:09

## 2020-12-08 RX ADMIN — SODIUM CHLORIDE 125 MILLILITER(S): 9 INJECTION, SOLUTION INTRAVENOUS at 17:56

## 2020-12-08 RX ADMIN — SODIUM CHLORIDE 1000 MILLILITER(S): 9 INJECTION, SOLUTION INTRAVENOUS at 13:08

## 2020-12-08 RX ADMIN — Medication 650 MILLIGRAM(S): at 06:00

## 2020-12-08 RX ADMIN — POLYETHYLENE GLYCOL 3350 17 GRAM(S): 17 POWDER, FOR SOLUTION ORAL at 12:04

## 2020-12-08 RX ADMIN — Medication 400 MILLIGRAM(S): at 09:27

## 2020-12-08 RX ADMIN — Medication 650 MILLIGRAM(S): at 11:30

## 2020-12-08 RX ADMIN — SODIUM CHLORIDE 75 MILLILITER(S): 9 INJECTION, SOLUTION INTRAVENOUS at 09:27

## 2020-12-08 NOTE — PROVIDER CONTACT NOTE (OTHER) - ACTION/TREATMENT ORDERED:
SHLOMO Harrell made aware of pt. complaint. Pt. placed on  & incentive spirometer use reinforced & encouraged. No further orders at this time. Will continue to monitor.

## 2020-12-08 NOTE — PROVIDER CONTACT NOTE (OTHER) - ASSESSMENT
Pt. V/S presented w/ tachycardia & O2 sat. of 88% on RA; placed on 3L NC. Pt. c/o stabbing pain in mediastinal chest area; denies any SOB or radiation of pain. Pt stated that she was having this pain in the ED before surgery & in PACU; also stated that she made PACU nurses aware of pain.

## 2020-12-08 NOTE — PROGRESS NOTE ADULT - SUBJECTIVE AND OBJECTIVE BOX
ANESTHESIA POSTOP CHECK    25y Female POSTOP DAY 1     No COMPLAINTS    NO APPARENT ANESTHESIA COMPLICATIONS

## 2020-12-08 NOTE — CONSULT NOTE ADULT - SUBJECTIVE AND OBJECTIVE BOX
Patient is a 25y old  Female who presents with a chief complaint of fever    HPI:  25F no significant PMH who presented with fever and LLQ abdominal pain.  Pain began on Saturday with sudden onset.  It was sharp in intensity and associated with nausea but no vomiting.  Yesterday evening additionally developed fever and for this went to the emergency room.  Upon presentation, no longer was experiencing pain.  UA demosntrated large leuks and >50 WBC's, but no nitrites.  WBC was 19.34.  Cr of 0.97, Lactate of 3.1.  Folowing AM pt became tachycardic with worsening leukocytosis and so a CTAP was obtained, demonstrating a 7mm left midureteral stone.     Allergies:  No Known Allergies    HOME MEDICATIONS:  none    MEDICATIONS  (STANDING):  cefTRIAXone   IVPB 1000 milliGRAM(s) IV Intermittent every 24 hours  dextrose 5% + sodium chloride 0.45%. 1000 milliLiter(s) (75 mL/Hr) IV Continuous <Continuous>  heparin   Injectable 5000 Unit(s) SubCutaneous every 8 hours  influenza   Vaccine 0.5 milliLiter(s) IntraMuscular once  pantoprazole    Tablet 40 milliGRAM(s) Oral before breakfast  polyethylene glycol 3350 17 Gram(s) Oral daily  senna 2 Tablet(s) Oral at bedtime  tamsulosin 0.4 milliGRAM(s) Oral at bedtime    MEDICATIONS  (PRN):  acetaminophen   Tablet .. 650 milliGRAM(s) Oral every 6 hours PRN Temp greater or equal to 38C (100.4F), Mild Pain (1 - 3)    PAST MEDICAL & SURGICAL HISTORY:  No pertinent past medical history  tonsilitis, s/p surgery    SOCIAL HISTORY:  lives with parents, working from home since October  no tob/alcohol/drugs  works for [employer]    FAMILY HISTORY:  Family history of tonsillitis (Aunt)  mother -   father -     REVIEW OF SYSTEMS:    CONSTITUTIONAL: No fever, weight loss, or fatigue  EYES: No eye pain, visual disturbances, or discharge  ENMT:  No difficulty hearing, tinnitus, vertigo; No sinus or throat pain  NECK: No pain or stiffness  BREASTS: No pain, masses, or nipple discharge  RESPIRATORY: No cough, wheezing, chills or hemoptysis; No shortness of breath  CARDIOVASCULAR: No chest pain, palpitations, dizziness, or leg swelling  GASTROINTESTINAL: No abdominal or epigastric pain. No nausea, vomiting, or hematemesis; No diarrhea or constipation. No melena or hematochezia.  GENITOURINARY: No dysuria, frequency, hematuria, or incontinence  NEUROLOGICAL: No headaches, memory loss, loss of strength, numbness, or tremors  SKIN: No itching, burning, rashes, or lesions   LYMPH NODES: No enlarged glands  ENDOCRINE: No heat or cold intolerance; No hair loss  MUSCULOSKELETAL: No muscle or back pain  PSYCHIATRIC: No depression, anxiety, mood swings, or difficulty sleeping  HEME/LYMPH: No easy bruising, or bleeding gums  ALLERGY AND IMMUNOLOGIC: No hives or eczema  [  ] All other ROS negative  [  ] Unable to obtain due to poor mental status    Vital Signs Last 24 Hrs  T(C): 39.2 (08 Dec 2020 10:25), Max: 39.4 (08 Dec 2020 08:37)  T(F): 102.6 (08 Dec 2020 10:25), Max: 103 (08 Dec 2020 08:37)  HR: 130 (08 Dec 2020 10:25) (99 - 130)  BP: 100/60 (08 Dec 2020 10:25) (100/60 - 124/74)  BP(mean): 82 (07 Dec 2020 23:00) (72 - 85)  RR: 24 (08 Dec 2020 10:25) (12 - 28)  SpO2: 98% (08 Dec 2020 10:25) (92% - 100%)    PHYSICAL EXAM:  GENERAL: sitting up in bed, +rigors  HEAD:  Atraumatic, Normocephalic  EYES: EOMI, PERRLA, conjunctiva and sclera clear  ENMT: Moist mucous membranes  NECK: Supple, No JVD  RESPIRATORY: Clear to auscultation bilaterally; No rales, rhonchi, wheezing, or rubs  CARDIOVASCULAR: Regular rate and rhythm; No murmurs, rubs, or gallops  GASTROINTESTINAL: Soft, Nontender, Nondistended; Bowel sounds present  GENITOURINARY: +shaffer  EXTREMITIES:  2+ Peripheral Pulses, No clubbing, cyanosis, or edema  NERVOUS SYSTEM:  Alert & Oriented X3; Moving all 4 extremities; No gross sensory deficits  HEME/LYMPH: No lymphadenopathy noted  SKIN: No rashes or lesions  PSYCH: calm, appropriate    LABS:                        10.6   31.81 )-----------( 245      ( 08 Dec 2020 07:20 )             34.4     12-08    138  |  106  |  9   ----------------------------<  111<H>  3.8   |  21<L>  |  0.82    Ca    8.2<L>      08 Dec 2020 06:51    TPro  7.1  /  Alb  4.1  /  TBili  0.2  /  DBili  x   /  AST  26  /  ALT  19  /  AlkPhos  111  12-07    PT/INR - ( 07 Dec 2020 15:37 )   PT: 16.9 sec;   INR: 1.51 ratio      Urinalysis Basic - ( 07 Dec 2020 01:07 )  Color: Yellow / Appearance: Slightly Turbid / S.024 / pH: x  Gluc: x / Ketone: Negative  / Bili: Negative / Urobili: <2 mg/dL   Blood: x / Protein: 30 mg/dL / Nitrite: Negative   Leuk Esterase: Large / RBC: 3-5 /HPF / WBC >50 /HPF   Sq Epi: x / Non Sq Epi: Few / Bacteria: Many    RADIOLOGY & ADDITIONAL STUDIES:    EKG:   Personally Reviewed:  [ ] YES     Imaging:   Personally Reviewed:  [ ] YES               Consultant(s) notes reviewed:    Care Discussed with Consultant(s)/Other Providers: urology re overall care Patient is a 25y old  Female who presents with a chief complaint of fever    HPI:  25F no significant PMH who presented with fever and LLQ abdominal pain.  Pain began on Saturday with sudden onset.  It was sharp in intensity and associated with nausea but no vomiting.  Yesterday evening additionally developed fever and for this went to the emergency room.  Upon presentation, no longer was experiencing pain.  UA demosntrated large leuks and >50 WBC's, but no nitrites.  WBC was 19.34. Cr of 0.97, Lactate of 3.1.  Folowing AM pt became tachycardic with worsening leukocytosis, CTAP showed 7mm left midureteral stone.   Over night developed     Allergies:  No Known Allergies    HOME MEDICATIONS:  none    MEDICATIONS  (STANDING):  cefTRIAXone   IVPB 1000 milliGRAM(s) IV Intermittent every 24 hours  dextrose 5% + sodium chloride 0.45%. 1000 milliLiter(s) (75 mL/Hr) IV Continuous <Continuous>  heparin   Injectable 5000 Unit(s) SubCutaneous every 8 hours  influenza   Vaccine 0.5 milliLiter(s) IntraMuscular once  pantoprazole    Tablet 40 milliGRAM(s) Oral before breakfast  polyethylene glycol 3350 17 Gram(s) Oral daily  senna 2 Tablet(s) Oral at bedtime  tamsulosin 0.4 milliGRAM(s) Oral at bedtime    MEDICATIONS  (PRN):  acetaminophen   Tablet .. 650 milliGRAM(s) Oral every 6 hours PRN Temp greater or equal to 38C (100.4F), Mild Pain (1 - 3)    PAST MEDICAL & SURGICAL HISTORY:  No pertinent past medical history  tonsilitis, s/p surgery    SOCIAL HISTORY:  lives with parents, working from home since October  no tob/alcohol/drugs  works for [employer]    FAMILY HISTORY:  Family history of tonsillitis (Aunt)  mother -   father -     REVIEW OF SYSTEMS:    CONSTITUTIONAL: No fever, weight loss, or fatigue  EYES: No eye pain, visual disturbances, or discharge  ENMT:  No difficulty hearing, tinnitus, vertigo; No sinus or throat pain  NECK: No pain or stiffness  BREASTS: No pain, masses, or nipple discharge  RESPIRATORY: No cough, wheezing, chills or hemoptysis; No shortness of breath  CARDIOVASCULAR: No chest pain, palpitations, dizziness, or leg swelling  GASTROINTESTINAL: No abdominal or epigastric pain. No nausea, vomiting, or hematemesis; No diarrhea or constipation. No melena or hematochezia.  GENITOURINARY: No dysuria, frequency, hematuria, or incontinence  NEUROLOGICAL: No headaches, memory loss, loss of strength, numbness, or tremors  SKIN: No itching, burning, rashes, or lesions   LYMPH NODES: No enlarged glands  ENDOCRINE: No heat or cold intolerance; No hair loss  MUSCULOSKELETAL: No muscle or back pain  PSYCHIATRIC: No depression, anxiety, mood swings, or difficulty sleeping  HEME/LYMPH: No easy bruising, or bleeding gums  ALLERGY AND IMMUNOLOGIC: No hives or eczema  [  ] All other ROS negative  [  ] Unable to obtain due to poor mental status    Vital Signs Last 24 Hrs  T(C): 39.2 (08 Dec 2020 10:25), Max: 39.4 (08 Dec 2020 08:37)  T(F): 102.6 (08 Dec 2020 10:25), Max: 103 (08 Dec 2020 08:37)  HR: 130 (08 Dec 2020 10:25) (99 - 130)  BP: 100/60 (08 Dec 2020 10:25) (100/60 - 124/74)  BP(mean): 82 (07 Dec 2020 23:00) (72 - 85)  RR: 24 (08 Dec 2020 10:25) (12 - 28)  SpO2: 98% (08 Dec 2020 10:25) (92% - 100%)    PHYSICAL EXAM:  GENERAL: sitting up in bed, +rigors  HEAD:  Atraumatic, Normocephalic  EYES: EOMI, PERRLA, conjunctiva and sclera clear  ENMT: Moist mucous membranes  NECK: Supple, No JVD  RESPIRATORY: Clear to auscultation bilaterally; No rales, rhonchi, wheezing, or rubs  CARDIOVASCULAR: Regular rate and rhythm; No murmurs, rubs, or gallops  GASTROINTESTINAL: Soft, Nontender, Nondistended; Bowel sounds present  GENITOURINARY: +shaffer  EXTREMITIES:  2+ Peripheral Pulses, No clubbing, cyanosis, or edema  NERVOUS SYSTEM:  Alert & Oriented X3; Moving all 4 extremities; No gross sensory deficits  HEME/LYMPH: No lymphadenopathy noted  SKIN: No rashes or lesions  PSYCH: calm, appropriate    LABS:                        10.6   31.81 )-----------( 245      ( 08 Dec 2020 07:20 )             34.4     12-08    138  |  106  |  9   ----------------------------<  111<H>  3.8   |  21<L>  |  0.82    Ca    8.2<L>      08 Dec 2020 06:51    TPro  7.1  /  Alb  4.1  /  TBili  0.2  /  DBili  x   /  AST  26  /  ALT  19  /  AlkPhos  111  12-07    PT/INR - ( 07 Dec 2020 15:37 )   PT: 16.9 sec;   INR: 1.51 ratio      Urinalysis Basic - ( 07 Dec 2020 01:07 )  Color: Yellow / Appearance: Slightly Turbid / S.024 / pH: x  Gluc: x / Ketone: Negative  / Bili: Negative / Urobili: <2 mg/dL   Blood: x / Protein: 30 mg/dL / Nitrite: Negative   Leuk Esterase: Large / RBC: 3-5 /HPF / WBC >50 /HPF   Sq Epi: x / Non Sq Epi: Few / Bacteria: Many    RADIOLOGY & ADDITIONAL STUDIES:    EKG:   Personally Reviewed:  [ ] YES     Imaging:   Personally Reviewed:  [ ] YES               Consultant(s) notes reviewed:    Care Discussed with Consultant(s)/Other Providers: urology re overall care Patient is a 25y old  Female who presents with a chief complaint of fever    HPI:  25F no significant PMH who presented with fever and LLQ abdominal pain.  Pain began on Saturday with sudden onset.  It was sharp in intensity and associated with nausea but no vomiting.  Yesterday evening additionally developed fever and for this went to the emergency room.  Upon presentation, no longer was experiencing pain.  UA demosntrated large leuks and >50 WBC's, but no nitrites.  WBC was 19.34. Cr of 0.97, Lactate of 3.1.  Folowing AM pt became tachycardic with worsening leukocytosis, CTAP showed 7mm left midureteral stone. 20 s/p cysto with placement of L ureteral stent, some purulence from L UO. Developed hypoxia 88% RA, tachycardia, c/o stabbing pain in mid chest area.  Seen earlier this am, still with fever, tachycardia, cough with some phlegm, mild SOB.  No pain.    Working from home, no known COVID exposures.  Allergies:  No Known Allergies    HOME MEDICATIONS:  none    MEDICATIONS  (STANDING):  cefTRIAXone   IVPB 1000 milliGRAM(s) IV Intermittent every 24 hours  dextrose 5% + sodium chloride 0.45%. 1000 milliLiter(s) (75 mL/Hr) IV Continuous <Continuous>  heparin   Injectable 5000 Unit(s) SubCutaneous every 8 hours  influenza   Vaccine 0.5 milliLiter(s) IntraMuscular once  pantoprazole    Tablet 40 milliGRAM(s) Oral before breakfast  polyethylene glycol 3350 17 Gram(s) Oral daily  senna 2 Tablet(s) Oral at bedtime  tamsulosin 0.4 milliGRAM(s) Oral at bedtime    MEDICATIONS  (PRN):  acetaminophen   Tablet .. 650 milliGRAM(s) Oral every 6 hours PRN Temp greater or equal to 38C (100.4F), Mild Pain (1 - 3)    PAST MEDICAL & SURGICAL HISTORY:  No pertinent past medical history  tonsilitis, s/p surgery    SOCIAL HISTORY:  lives with parents, working from home since October  no tob/alcohol/drugs  works for [employer]    FAMILY HISTORY:  Family history of tonsillitis (Aunt)  no significant FH    REVIEW OF SYSTEMS:  CONSTITUTIONAL: per HPI  EYES: No eye pain, visual disturbances, or discharge  ENMT:  No difficulty hearing, tinnitus, vertigo; No sinus or throat pain  NECK: No pain or stiffness  BREASTS: No pain, masses, or nipple discharge  RESPIRATORY: per HPI  CARDIOVASCULAR: No palpitations, dizziness, or leg swelling  GASTROINTESTINAL: per HPI No nausea, vomiting, or hematemesis; No diarrhea or constipation. No melena or hematochezia.  GENITOURINARY: No dysuria, frequency, hematuria, or incontinence  NEUROLOGICAL: No headaches, memory loss, loss of strength, numbness, or tremors  SKIN: No itching, burning, rashes, or lesions   LYMPH NODES: No enlarged glands  ENDOCRINE: No heat or cold intolerance; No hair loss  MUSCULOSKELETAL: No muscle or back pain  PSYCHIATRIC: No depression, anxiety, mood swings, or difficulty sleeping  HEME/LYMPH: No easy bruising, or bleeding gums  ALLERGY AND IMMUNOLOGIC: No hives or eczema  [  ] All other ROS negative  [  ] Unable to obtain due to poor mental status    Vital Signs Last 24 Hrs  T(C): 39.2 (08 Dec 2020 10:25), Max: 39.4 (08 Dec 2020 08:37)  T(F): 102.6 (08 Dec 2020 10:25), Max: 103 (08 Dec 2020 08:37)  HR: 130 (08 Dec 2020 10:25) (99 - 130)  BP: 100/60 (08 Dec 2020 10:25) (100/60 - 124/74)  BP(mean): 82 (07 Dec 2020 23:00) (72 - 85)  RR: 24 (08 Dec 2020 10:25) (12 - 28)  SpO2: 98% (08 Dec 2020 10:25) (92% - 100%)    PHYSICAL EXAM:  GENERAL: sitting up in bed, +rigors   HEAD:  Atraumatic, Normocephalic  EYES: EOMI, PERRLA, conjunctiva and sclera clear  ENMT: Moist mucous membranes  NECK: Supple, No JVD  RESPIRATORY: dec BS bases  CARDIOVASCULAR: Regular rate and rhythm; No murmurs, rubs, or gallops  GASTROINTESTINAL: Soft, Nontender, Nondistended; Bowel sounds present  GENITOURINARY: +shaffer  EXTREMITIES:  2+ Peripheral Pulses, No clubbing, cyanosis, or edema  NERVOUS SYSTEM:  Alert & Oriented X3; Moving all 4 extremities; No gross sensory deficits  HEME/LYMPH: No lymphadenopathy noted  SKIN: No rashes or lesions  PSYCH: calm, appropriate    LABS:                        10.6   31.81 )-----------( 245      ( 08 Dec 2020 07:20 )             34.4     12-08    138  |  106  |  9   ----------------------------<  111<H>  3.8   |  21<L>  |  0.82    Ca    8.2<L>      08 Dec 2020 06:51    TPro  7.1  /  Alb  4.1  /  TBili  0.2  /  DBili  x   /  AST  26  /  ALT  19  /  AlkPhos  111  12-07    PT/INR - ( 07 Dec 2020 15:37 )   PT: 16.9 sec;   INR: 1.51 ratio      Urinalysis Basic - ( 07 Dec 2020 01:07 )  Color: Yellow / Appearance: Slightly Turbid / S.024 / pH: x  Gluc: x / Ketone: Negative  / Bili: Negative / Urobili: <2 mg/dL   Blood: x / Protein: 30 mg/dL / Nitrite: Negative   Leuk Esterase: Large / RBC: 3-5 /HPF / WBC >50 /HPF   Sq Epi: x / Non Sq Epi: Few / Bacteria: Many    RADIOLOGY & ADDITIONAL STUDIES:    EKG:   Personally Reviewed:  [ ] YES     Imaging:   Personally Reviewed:  [ ] YES               Consultant(s) notes reviewed:    Care Discussed with Consultant(s)/Other Providers: urology re overall care

## 2020-12-08 NOTE — CONSULT NOTE ADULT - ASSESSMENT
25F no sig PMH, a/w severe sepsis from UTI, found to have Left-sided obstructive uropathy with mild to moderate left hydroureteronephrosis to the level of a 6 mm mid ureteral stone, s/p L ureteral stent with purulence from UO.  Persistently febrile, tachycardic, developed hypoxia, elevated lactate

## 2020-12-08 NOTE — PROGRESS NOTE ADULT - ASSESSMENT
POD#1 s/p L. 7mm mid uret stone; stent placed; WBC 31  Plan:  - monitor VS  - OOB  - IS  - check cultures  - cont ceftriaxone  - leave in shaffer for now

## 2020-12-08 NOTE — CONSULT NOTE ADULT - PROBLEM SELECTOR RECOMMENDATION 9
Left-sided obstructive uropathy with mild to moderate left hydroureteronephrosis to the level of a 6 mm mid ureteral stone, s/p L ureteral stent with purulence from UO on 12/7/20  - urine growing >100,000 GNR - would expand coverage to meropenem, f/u urine/blood cultures

## 2020-12-08 NOTE — PROGRESS NOTE ADULT - SUBJECTIVE AND OBJECTIVE BOX
POD #1  Tmax 100.8  5pm yesterday  Afeb 107/60 100's  96% 1 L NC  Pt has c/o "chest discomfort" and needing to catch breath  She appears comfortable, no SOB, in NAD  Abd- soft NT ND   Stanley 1L

## 2020-12-08 NOTE — CONSULT NOTE ADULT - PROBLEM SELECTOR RECOMMENDATION 2
severe sepsis s/p fluid resuscitation in ED, lactate normalized, recurrent fever, tachycardia, developed hypoxia, lactate elevated 3.4 - rebolus, broaden abx coverage to raul, f/u VS closely, low threshold for ICU evaluation  hypoxia seems most c/w severe sepsis  - team checking CTA  - COVID negative in ED, repeat pending, isolation for now

## 2020-12-09 LAB
-  AMIKACIN: SIGNIFICANT CHANGE UP
-  AMOXICILLIN/CLAVULANIC ACID: SIGNIFICANT CHANGE UP
-  AMPICILLIN/SULBACTAM: SIGNIFICANT CHANGE UP
-  AMPICILLIN: SIGNIFICANT CHANGE UP
-  AZTREONAM: SIGNIFICANT CHANGE UP
-  CEFAZOLIN: SIGNIFICANT CHANGE UP
-  CEFEPIME: SIGNIFICANT CHANGE UP
-  CEFOXITIN: SIGNIFICANT CHANGE UP
-  CEFTRIAXONE: SIGNIFICANT CHANGE UP
-  CIPROFLOXACIN: SIGNIFICANT CHANGE UP
-  ERTAPENEM: SIGNIFICANT CHANGE UP
-  GENTAMICIN: SIGNIFICANT CHANGE UP
-  LEVOFLOXACIN: SIGNIFICANT CHANGE UP
-  MEROPENEM: SIGNIFICANT CHANGE UP
-  NITROFURANTOIN: SIGNIFICANT CHANGE UP
-  PIPERACILLIN/TAZOBACTAM: SIGNIFICANT CHANGE UP
-  TOBRAMYCIN: SIGNIFICANT CHANGE UP
-  TRIMETHOPRIM/SULFAMETHOXAZOLE: SIGNIFICANT CHANGE UP
ANION GAP SERPL CALC-SCNC: 9 MMOL/L — SIGNIFICANT CHANGE UP (ref 7–14)
BUN SERPL-MCNC: 8 MG/DL — SIGNIFICANT CHANGE UP (ref 7–23)
CALCIUM SERPL-MCNC: 8.4 MG/DL — SIGNIFICANT CHANGE UP (ref 8.4–10.5)
CHLORIDE SERPL-SCNC: 104 MMOL/L — SIGNIFICANT CHANGE UP (ref 98–107)
CO2 SERPL-SCNC: 25 MMOL/L — SIGNIFICANT CHANGE UP (ref 22–31)
CREAT SERPL-MCNC: 0.85 MG/DL — SIGNIFICANT CHANGE UP (ref 0.5–1.3)
CULTURE RESULTS: NO GROWTH — SIGNIFICANT CHANGE UP
CULTURE RESULTS: SIGNIFICANT CHANGE UP
CULTURE RESULTS: SIGNIFICANT CHANGE UP
GLUCOSE SERPL-MCNC: 87 MG/DL — SIGNIFICANT CHANGE UP (ref 70–99)
HCT VFR BLD CALC: 33 % — LOW (ref 34.5–45)
HGB BLD-MCNC: 10.7 G/DL — LOW (ref 11.5–15.5)
LACTATE SERPL-SCNC: 1.2 MMOL/L — SIGNIFICANT CHANGE UP (ref 0.5–2)
MCHC RBC-ENTMCNC: 28.1 PG — SIGNIFICANT CHANGE UP (ref 27–34)
MCHC RBC-ENTMCNC: 32.4 GM/DL — SIGNIFICANT CHANGE UP (ref 32–36)
MCV RBC AUTO: 86.6 FL — SIGNIFICANT CHANGE UP (ref 80–100)
METHOD TYPE: SIGNIFICANT CHANGE UP
NRBC # BLD: 0 /100 WBCS — SIGNIFICANT CHANGE UP
NRBC # FLD: 0 K/UL — SIGNIFICANT CHANGE UP
ORGANISM # SPEC MICROSCOPIC CNT: SIGNIFICANT CHANGE UP
ORGANISM # SPEC MICROSCOPIC CNT: SIGNIFICANT CHANGE UP
PLATELET # BLD AUTO: 255 K/UL — SIGNIFICANT CHANGE UP (ref 150–400)
POTASSIUM SERPL-MCNC: 3.6 MMOL/L — SIGNIFICANT CHANGE UP (ref 3.5–5.3)
POTASSIUM SERPL-SCNC: 3.6 MMOL/L — SIGNIFICANT CHANGE UP (ref 3.5–5.3)
RBC # BLD: 3.81 M/UL — SIGNIFICANT CHANGE UP (ref 3.8–5.2)
RBC # FLD: 13.1 % — SIGNIFICANT CHANGE UP (ref 10.3–14.5)
SODIUM SERPL-SCNC: 138 MMOL/L — SIGNIFICANT CHANGE UP (ref 135–145)
SPECIMEN SOURCE: SIGNIFICANT CHANGE UP
WBC # BLD: 26.42 K/UL — HIGH (ref 3.8–10.5)
WBC # FLD AUTO: 26.42 K/UL — HIGH (ref 3.8–10.5)

## 2020-12-09 PROCEDURE — 99233 SBSQ HOSP IP/OBS HIGH 50: CPT

## 2020-12-09 RX ORDER — CEFTRIAXONE 500 MG/1
1000 INJECTION, POWDER, FOR SOLUTION INTRAMUSCULAR; INTRAVENOUS EVERY 24 HOURS
Refills: 0 | Status: DISCONTINUED | OUTPATIENT
Start: 2020-12-10 | End: 2020-12-10

## 2020-12-09 RX ORDER — IBUPROFEN 200 MG
600 TABLET ORAL ONCE
Refills: 0 | Status: COMPLETED | OUTPATIENT
Start: 2020-12-09 | End: 2020-12-09

## 2020-12-09 RX ORDER — CEFTRIAXONE 500 MG/1
INJECTION, POWDER, FOR SOLUTION INTRAMUSCULAR; INTRAVENOUS
Refills: 0 | Status: DISCONTINUED | OUTPATIENT
Start: 2020-12-09 | End: 2020-12-10

## 2020-12-09 RX ORDER — CEFTRIAXONE 500 MG/1
1000 INJECTION, POWDER, FOR SOLUTION INTRAMUSCULAR; INTRAVENOUS ONCE
Refills: 0 | Status: COMPLETED | OUTPATIENT
Start: 2020-12-09 | End: 2020-12-09

## 2020-12-09 RX ADMIN — Medication 650 MILLIGRAM(S): at 00:25

## 2020-12-09 RX ADMIN — HEPARIN SODIUM 5000 UNIT(S): 5000 INJECTION INTRAVENOUS; SUBCUTANEOUS at 22:15

## 2020-12-09 RX ADMIN — Medication 650 MILLIGRAM(S): at 14:41

## 2020-12-09 RX ADMIN — HEPARIN SODIUM 5000 UNIT(S): 5000 INJECTION INTRAVENOUS; SUBCUTANEOUS at 05:15

## 2020-12-09 RX ADMIN — CEFTRIAXONE 100 MILLIGRAM(S): 500 INJECTION, POWDER, FOR SOLUTION INTRAMUSCULAR; INTRAVENOUS at 14:40

## 2020-12-09 RX ADMIN — Medication 650 MILLIGRAM(S): at 15:40

## 2020-12-09 RX ADMIN — MEROPENEM 100 MILLIGRAM(S): 1 INJECTION INTRAVENOUS at 09:03

## 2020-12-09 RX ADMIN — Medication 650 MILLIGRAM(S): at 22:34

## 2020-12-09 RX ADMIN — Medication 600 MILLIGRAM(S): at 00:30

## 2020-12-09 RX ADMIN — HEPARIN SODIUM 5000 UNIT(S): 5000 INJECTION INTRAVENOUS; SUBCUTANEOUS at 14:40

## 2020-12-09 RX ADMIN — TAMSULOSIN HYDROCHLORIDE 0.4 MILLIGRAM(S): 0.4 CAPSULE ORAL at 22:15

## 2020-12-09 RX ADMIN — Medication 650 MILLIGRAM(S): at 22:14

## 2020-12-09 RX ADMIN — PANTOPRAZOLE SODIUM 40 MILLIGRAM(S): 20 TABLET, DELAYED RELEASE ORAL at 05:15

## 2020-12-09 NOTE — PROGRESS NOTE ADULT - ASSESSMENT
25F no PMH w/7mm left mid ureteral stone s/p L stent placement; febrile to 103 yesterday    Plan:  -- pt  w/SOB, CP, yesterday - CTangio negative  -- broadened to meropenem, UCx growing Proteus, await sensitivities  -- TOV w/afebrile >24hrs  -- IS/OOB  -- appreciate hospitalist recs

## 2020-12-09 NOTE — PROGRESS NOTE ADULT - PROBLEM SELECTOR PLAN 1
Left-sided obstructive uropathy with mild to moderate left hydroureteronephrosis to the level of a 6 mm mid ureteral stone, s/p L ureteral stent with purulence from UO on 12/7/20  - urine growing >100,000 Proteus, can deescalate to ceftriaxone, f/u urine/blood cultures.

## 2020-12-09 NOTE — PROGRESS NOTE ADULT - PROBLEM SELECTOR PLAN 2
severe sepsis - improving, changing to CTX, c/w IVFs  hypoxia c/w severe sepsis - CTA no PE, +bilateral patchy opacities  - COVID negative x3

## 2020-12-09 NOTE — PROGRESS NOTE ADULT - ASSESSMENT
25F no PMH w/7mm left mid ureteral stone s/p L stent placement  -- pt doing, w/SOB, CP, yesterday - CTangio negative  -- broadened to meropenem, UCx growing Proteus, await sensitivities  -- TOV w/afebrile >24hrs  -- IS/OOB  -- appreciate hospitalist recs

## 2020-12-09 NOTE — PROGRESS NOTE ADULT - ASSESSMENT
25F no sig PMH, a/w severe sepsis from UTI, found to have Left-sided obstructive uropathy with mild to moderate left hydroureteronephrosis to the level of a 6 mm mid ureteral stone, s/p L ureteral stent with purulence from UO.  Persistently febrile, tachycardic, developed hypoxia, elevated lactate --> defervescing, sepsis improving, urine +Proteus

## 2020-12-09 NOTE — PROGRESS NOTE ADULT - SUBJECTIVE AND OBJECTIVE BOX
POD #2  Tmax 102  100.4  110/72 107 99%2L  Pt has no new  c/o; some feeling of SOB  Covid neg X3; CT angio - no P.E.  Abd- soft NT ND   Stanley 1500

## 2020-12-09 NOTE — PROGRESS NOTE ADULT - SUBJECTIVE AND OBJECTIVE BOX
[facility] Division of Hospital Medicine  Gilda Kessler MD  Pager (M-F, 8A-5P):  In-house pager [phone]; Long-range pager [phone]  Other Times:  Please page Hospitalist in Charge -  In-house pager [phone]    Patient is a 25y old  Female who presents with a chief complaint of septic L kidney stone (08 Dec 2020 11:48)    SUBJECTIVE / OVERNIGHT EVENTS: Didn't feel well because of fever and body aches last night.  Cough with phlegm.    ADDITIONAL REVIEW OF SYSTEMS:    MEDICATIONS  (STANDING):  cefTRIAXone   IVPB      cefTRIAXone   IVPB 1000 milliGRAM(s) IV Intermittent once  heparin   Injectable 5000 Unit(s) SubCutaneous every 8 hours  influenza   Vaccine 0.5 milliLiter(s) IntraMuscular once  lactated ringers. 1000 milliLiter(s) (125 mL/Hr) IV Continuous <Continuous>  pantoprazole    Tablet 40 milliGRAM(s) Oral before breakfast  polyethylene glycol 3350 17 Gram(s) Oral daily  senna 2 Tablet(s) Oral at bedtime  tamsulosin 0.4 milliGRAM(s) Oral at bedtime    MEDICATIONS  (PRN):  acetaminophen   Tablet .. 650 milliGRAM(s) Oral every 6 hours PRN Temp greater or equal to 38C (100.4F), Mild Pain (1 - 3)    I&O's Summary    08 Dec 2020 07:01  -  09 Dec 2020 07:00  --------------------------------------------------------  IN: 0 mL / OUT: 3700 mL / NET: -3700 mL    09 Dec 2020 07:01  -  09 Dec 2020 12:51  --------------------------------------------------------  IN: 0 mL / OUT: 1000 mL / NET: -1000 mL    PHYSICAL EXAM:  Vital Signs Last 24 Hrs  T(C): 37.5 (09 Dec 2020 09:44), Max: 39 (09 Dec 2020 01:10)  T(F): 99.5 (09 Dec 2020 09:44), Max: 102.2 (09 Dec 2020 01:10)  HR: 113 (09 Dec 2020 09:44) (83 - 113)  BP: 118/75 (09 Dec 2020 09:44) (110/72 - 121/73)  BP(mean): --  RR: 20 (09 Dec 2020 09:44) (20 - 20)  SpO2: 98% (09 Dec 2020 11:24) (94% - 100%)    GENERAL: sitting up in bed, looks tired  RESPIRATORY: dec BS/rhonci   CARDIOVASCULAR: Regular rate and rhythm; No murmurs, rubs, or gallops  GASTROINTESTINAL: Soft, Nontender, Nondistended; Bowel sounds present  GENITOURINARY: shaffer has been removed  EXTREMITIES:  2+ Peripheral Pulses, No clubbing, cyanosis, or edema  NERVOUS SYSTEM: nonfocal  SKIN: No rashes or lesions  PSYCH: calm, appropriate    LABS:                        10.7   26.42 )-----------( 255      ( 09 Dec 2020 07:40 )             33.0     12-09    138  |  104  |  8   ----------------------------<  87  3.6   |  25  |  0.85    Ca    8.4      09 Dec 2020 07:40    PT/INR - ( 07 Dec 2020 15:37 )   PT: 16.9 sec;   INR: 1.51 ratio      Culture - Urine (collected 08 Dec 2020 08:50)  Source: .Urine Clean Catch (Midstream)  Final Report (09 Dec 2020 09:27):    No growth    Culture - Urine (collected 08 Dec 2020 00:45)  Source: Kidney LEFT KIDNEY URINE CULTURE  Preliminary Report (08 Dec 2020 21:30):    No growth to date.    Culture - Blood (collected 07 Dec 2020 06:59)  Source: .Blood Blood-Venous  Preliminary Report (08 Dec 2020 07:01):    No growth to date.    Culture - Blood (collected 07 Dec 2020 06:56)  Source: .Blood Blood-Peripheral  Preliminary Report (08 Dec 2020 07:01):    No growth to date.    Culture - Urine (collected 06 Dec 2020 23:15)  Source: .Urine Clean Catch (Midstream)  Final Report (09 Dec 2020 11:06):    >100,000 CFU/ml Proteus mirabilis    <10,000 CFU/ml Normal Urogenital madison present  Organism: Proteus mirabilis (09 Dec 2020 11:06)  Organism: Proteus mirabilis (09 Dec 2020 11:06)    RADIOLOGY & ADDITIONAL TESTS:  < from: CT Angio Chest w/ IV Cont (12.08.20 @ 18:01) >  LUNGS AND AIRWAYS: Patent central airways.  Patchy opacities are present within the right middle lobe, left upper and both lower lobes, more so within the left lower lobe. The findings are new when compared to CT scan of the abdomen of 12/7/2020.  PLEURA: No pleural effusion.  MEDIASTINUM AND NOLBERTO: No lymphadenopathy.  VESSELS: Pulmonary artery is normal in caliber. No filling defects are noted. Left vertebral artery originates off the aortic arch, common normal variant.  HEART: Heart size is normal. No pericardial effusion.  CHEST WALL AND LOWER NECK: Within normal limits.  VISUALIZED UPPER ABDOMEN: The high attenuation lesion lesion measuring 1.8 x 2.2 cm is noted in the liver. For complete interpretation of the abdominal contents, please see the report of the CT scan of the abdomen performed on 12/7/2020. Left ureteral stent.  BONES: Within normal limits.    IMPRESSION:    No pulmonary embolism.    Patchy opacities are present within the right middle lobe, left upper lobe and both lower lobes. The findings are new when compared to study of 12/7/2020. Exact etiology is unclear.    2.2 cm high attenuation lesion is noted within the liver. MRI of the liver is with intravenous contrast is recommended for further evaluation.    Results Reviewed:   Imaging Personally Reviewed:  Electrocardiogram Personally Reviewed:    COORDINATION OF CARE:  Care Discussed with Consultants/Other Providers [Y/N]: urology re overall care  Prior or Outpatient Records Reviewed [Y/N]:

## 2020-12-09 NOTE — PROGRESS NOTE ADULT - SUBJECTIVE AND OBJECTIVE BOX
Subjective  Remains febrile overnight, Tmax 103, last febrile 100.4 @2am. Pt w/some persistent SOB.    Objective    Vital signs  T(F): , Max: 103 (12-08-20 @ 08:37)  HR: 108 (12-09-20 @ 05:09)  BP: 110/72 (12-09-20 @ 05:09)  SpO2: 94% (12-09-20 @ 05:09)  Wt(kg): --    Output     12-07 @ 07:01  -  12-08 @ 07:00  --------------------------------------------------------  IN: 3020 mL / OUT: 1295 mL / NET: 1725 mL    12-08 @ 07:01  -  12-09 @ 06:12  --------------------------------------------------------  IN: 0 mL / OUT: 3700 mL / NET: -3700 mL        Gen: NAD  Abd: soft, NT, ND  : shaffer clear yellow    Labs      12-08 @ 07:20    WBC 31.81 / Hct 34.4  / SCr --       12-08 @ 06:51    WBC --    / Hct --    / SCr 0.82       Urine Cx: left kidney culture NGTD  Culture - Urine (12.07.20 @ 02:42)   Specimen Source: .Urine Clean Catch (Midstream)   Culture Results:   >100,000 CFU/ml Proteus mirabilis   <10,000 CFU/ml Normal Urogenital madison present

## 2020-12-10 ENCOUNTER — TRANSCRIPTION ENCOUNTER (OUTPATIENT)
Age: 25
End: 2020-12-10

## 2020-12-10 DIAGNOSIS — R09.02 HYPOXEMIA: ICD-10-CM

## 2020-12-10 LAB
ANION GAP SERPL CALC-SCNC: 12 MMOL/L — SIGNIFICANT CHANGE UP (ref 7–14)
BUN SERPL-MCNC: 7 MG/DL — SIGNIFICANT CHANGE UP (ref 7–23)
CALCIUM SERPL-MCNC: 8.7 MG/DL — SIGNIFICANT CHANGE UP (ref 8.4–10.5)
CHLORIDE SERPL-SCNC: 102 MMOL/L — SIGNIFICANT CHANGE UP (ref 98–107)
CO2 SERPL-SCNC: 24 MMOL/L — SIGNIFICANT CHANGE UP (ref 22–31)
CREAT SERPL-MCNC: 0.77 MG/DL — SIGNIFICANT CHANGE UP (ref 0.5–1.3)
GLUCOSE SERPL-MCNC: 104 MG/DL — HIGH (ref 70–99)
HCT VFR BLD CALC: 33.6 % — LOW (ref 34.5–45)
HGB BLD-MCNC: 10.8 G/DL — LOW (ref 11.5–15.5)
MCHC RBC-ENTMCNC: 27.8 PG — SIGNIFICANT CHANGE UP (ref 27–34)
MCHC RBC-ENTMCNC: 32.1 GM/DL — SIGNIFICANT CHANGE UP (ref 32–36)
MCV RBC AUTO: 86.4 FL — SIGNIFICANT CHANGE UP (ref 80–100)
NRBC # BLD: 0 /100 WBCS — SIGNIFICANT CHANGE UP
NRBC # FLD: 0 K/UL — SIGNIFICANT CHANGE UP
NT-PROBNP SERPL-SCNC: 1249 PG/ML — HIGH
PLATELET # BLD AUTO: 268 K/UL — SIGNIFICANT CHANGE UP (ref 150–400)
POTASSIUM SERPL-MCNC: 3.3 MMOL/L — LOW (ref 3.5–5.3)
POTASSIUM SERPL-SCNC: 3.3 MMOL/L — LOW (ref 3.5–5.3)
RBC # BLD: 3.89 M/UL — SIGNIFICANT CHANGE UP (ref 3.8–5.2)
RBC # FLD: 13.2 % — SIGNIFICANT CHANGE UP (ref 10.3–14.5)
SODIUM SERPL-SCNC: 138 MMOL/L — SIGNIFICANT CHANGE UP (ref 135–145)
SPECIMEN SOURCE: SIGNIFICANT CHANGE UP
WBC # BLD: 18.26 K/UL — HIGH (ref 3.8–10.5)
WBC # FLD AUTO: 18.26 K/UL — HIGH (ref 3.8–10.5)

## 2020-12-10 PROCEDURE — 71046 X-RAY EXAM CHEST 2 VIEWS: CPT | Mod: 26

## 2020-12-10 PROCEDURE — 99233 SBSQ HOSP IP/OBS HIGH 50: CPT

## 2020-12-10 PROCEDURE — 99223 1ST HOSP IP/OBS HIGH 75: CPT | Mod: GC,25

## 2020-12-10 RX ORDER — AZITHROMYCIN 500 MG/1
TABLET, FILM COATED ORAL
Refills: 0 | Status: DISCONTINUED | OUTPATIENT
Start: 2020-12-10 | End: 2020-12-11

## 2020-12-10 RX ORDER — POTASSIUM CHLORIDE 20 MEQ
40 PACKET (EA) ORAL EVERY 4 HOURS
Refills: 0 | Status: COMPLETED | OUTPATIENT
Start: 2020-12-10 | End: 2020-12-10

## 2020-12-10 RX ORDER — PIPERACILLIN AND TAZOBACTAM 4; .5 G/20ML; G/20ML
3.38 INJECTION, POWDER, LYOPHILIZED, FOR SOLUTION INTRAVENOUS ONCE
Refills: 0 | Status: COMPLETED | OUTPATIENT
Start: 2020-12-10 | End: 2020-12-10

## 2020-12-10 RX ORDER — AZITHROMYCIN 500 MG/1
500 TABLET, FILM COATED ORAL ONCE
Refills: 0 | Status: COMPLETED | OUTPATIENT
Start: 2020-12-10 | End: 2020-12-10

## 2020-12-10 RX ORDER — PIPERACILLIN AND TAZOBACTAM 4; .5 G/20ML; G/20ML
3.38 INJECTION, POWDER, LYOPHILIZED, FOR SOLUTION INTRAVENOUS EVERY 8 HOURS
Refills: 0 | Status: DISCONTINUED | OUTPATIENT
Start: 2020-12-10 | End: 2020-12-12

## 2020-12-10 RX ORDER — AZITHROMYCIN 500 MG/1
500 TABLET, FILM COATED ORAL EVERY 24 HOURS
Refills: 0 | Status: DISCONTINUED | OUTPATIENT
Start: 2020-12-11 | End: 2020-12-11

## 2020-12-10 RX ADMIN — TAMSULOSIN HYDROCHLORIDE 0.4 MILLIGRAM(S): 0.4 CAPSULE ORAL at 21:27

## 2020-12-10 RX ADMIN — AZITHROMYCIN 255 MILLIGRAM(S): 500 TABLET, FILM COATED ORAL at 12:02

## 2020-12-10 RX ADMIN — HEPARIN SODIUM 5000 UNIT(S): 5000 INJECTION INTRAVENOUS; SUBCUTANEOUS at 05:01

## 2020-12-10 RX ADMIN — Medication 40 MILLIEQUIVALENT(S): at 18:28

## 2020-12-10 RX ADMIN — Medication 40 MILLIEQUIVALENT(S): at 21:27

## 2020-12-10 RX ADMIN — SENNA PLUS 2 TABLET(S): 8.6 TABLET ORAL at 21:35

## 2020-12-10 RX ADMIN — HEPARIN SODIUM 5000 UNIT(S): 5000 INJECTION INTRAVENOUS; SUBCUTANEOUS at 21:27

## 2020-12-10 RX ADMIN — Medication 650 MILLIGRAM(S): at 21:36

## 2020-12-10 RX ADMIN — PANTOPRAZOLE SODIUM 40 MILLIGRAM(S): 20 TABLET, DELAYED RELEASE ORAL at 05:01

## 2020-12-10 RX ADMIN — HEPARIN SODIUM 5000 UNIT(S): 5000 INJECTION INTRAVENOUS; SUBCUTANEOUS at 14:11

## 2020-12-10 RX ADMIN — PIPERACILLIN AND TAZOBACTAM 25 GRAM(S): 4; .5 INJECTION, POWDER, LYOPHILIZED, FOR SOLUTION INTRAVENOUS at 21:21

## 2020-12-10 RX ADMIN — PIPERACILLIN AND TAZOBACTAM 200 GRAM(S): 4; .5 INJECTION, POWDER, LYOPHILIZED, FOR SOLUTION INTRAVENOUS at 07:16

## 2020-12-10 RX ADMIN — PIPERACILLIN AND TAZOBACTAM 25 GRAM(S): 4; .5 INJECTION, POWDER, LYOPHILIZED, FOR SOLUTION INTRAVENOUS at 14:11

## 2020-12-10 NOTE — CONSULT NOTE ADULT - SUBJECTIVE AND OBJECTIVE BOX
CHIEF COMPLAINT: Shortness of breath    HPI: 25F with no PMH admitted with L pyelonephritis s/p ureteral stent 12/9 for 7 mm mid ureteral stone with urine culture growing proteus, pulmonology consulted for hypoxemia. After having her procedure 12/9, pt developed cough productive of yellow sputum and mild shortness of breath for the past few days and CTA chest performed 12/8 consistent with multifocal pneumonia, no pulmonary embolism. Pt saturating 92% on RA, says that her cough and shortness of breath are improving.       PAST MEDICAL & SURGICAL HISTORY:  No pertinent past medical history    No significant past surgical history      FAMILY HISTORY:  Family history of tonsillitis (Aunt)        SOCIAL HISTORY:  Smoking: [ x] Never Smoked [ ] Former Smoker (__ packs x ___ years) [ ] Current Smoker  (__ packs x ___ years)  Substance Use: [x ] Never Used [ ] Used ____  EtOH Use: none  Marital Status: [ ] Single [ ]  [ ]  [ ]   Sexual History:   Occupation:  Recent Travel:  Country of Birth:  Advance Directives:    Allergies    No Known Allergies    Intolerances        HOME MEDICATIONS:  Home Medications:      REVIEW OF SYSTEMS:  Constitutional: [ ] negative [ ] fevers [ ] chills [ ] weight loss [ ] weight gain [ ] malaise  HEENT: [ ] negative [ ] visual disturbances [ ] postnasal drip [ ] nasal congestion [ ] sore throat  CV: [ ] negative [ ] chest pain [ ] palpitations [ ] orthopnea   Resp: [ ] negative [x ] cough [x ] shortness of breath [ ] wheezing [ ] sputum [ ] hemoptysis  GI: [ ] negative [ ] nausea [ ] vomiting [ ] abdominal pain [ ] dysphagia [ ] diarrhea [ ] melena [ ] hematochezia  : [ ] negative [ ] dysuria [ ] nocturia [ ] hematuria [ ] increased urinary frequency  Musculoskeletal: [ ] negative [ ] back pain [ ] myalgias [ ] arthralgias [ ] fracture  Skin: [ ] negative [ ] rash [ ] pruritus  Neurological: [ ] negative [ ] headache [ ] dizziness [ ] syncope [ ] weakness [ ] numbness  Psychiatric: [ ] negative [ ] anxiety [ ] depression  Endocrine: [ ] negative [ ] diabetes [ ] thyroid problem  Hematologic/Lymphatic: [ ] negative [ ] anemia [ ] bleeding problem  Allergic/Immunologic: [ ] hives [ ] angioedema  [x] All other systems negative  [ ] Unable to assess ROS because ________    OBJECTIVE:  ICU Vital Signs Last 24 Hrs  T(C): 37.1 (10 Dec 2020 13:26), Max: 38.6 (09 Dec 2020 21:33)  T(F): 98.8 (10 Dec 2020 13:26), Max: 101.5 (09 Dec 2020 21:33)  HR: 100 (10 Dec 2020 13:26) (98 - 112)  BP: 129/86 (10 Dec 2020 13:26) (120/76 - 133/76)  BP(mean): --  ABP: --  ABP(mean): --  RR: 18 (10 Dec 2020 13:26) (18 - 20)  SpO2: 100% (10 Dec 2020 13:26) (95% - 100%)        12-09 @ 07:01  -  12-10 @ 07:00  --------------------------------------------------------  IN: 0 mL / OUT: 4550 mL / NET: -4550 mL    12-10 @ 07:01  -  12-10 @ 16:28  --------------------------------------------------------  IN: 0 mL / OUT: 850 mL / NET: -850 mL      CAPILLARY BLOOD GLUCOSE      PHYSICAL EXAM:  General: NAD, appears comfortable  Skin: Warm and dry, no rashes  Neuro: AAOx3, nonfocal  HEENT: PERRL, EOMI, no oral lesions  Lungs: CTA b/l, normal respiratory effort   Heart: Regular rate and rhythm, no murmurs  Abdomen: Normoactive bowl sounds, soft, nontender, nondistended  Extremities: No lower extremity tenderness, erythema, or edema   Psych: normal mood and affect      HOSPITAL MEDICATIONS:  Standing Meds:  azithromycin  IVPB      heparin   Injectable 5000 Unit(s) SubCutaneous every 8 hours  influenza   Vaccine 0.5 milliLiter(s) IntraMuscular once  pantoprazole    Tablet 40 milliGRAM(s) Oral before breakfast  piperacillin/tazobactam IVPB.. 3.375 Gram(s) IV Intermittent every 8 hours  polyethylene glycol 3350 17 Gram(s) Oral daily  potassium chloride    Tablet ER 40 milliEquivalent(s) Oral every 4 hours  senna 2 Tablet(s) Oral at bedtime  tamsulosin 0.4 milliGRAM(s) Oral at bedtime      PRN Meds:  acetaminophen   Tablet .. 650 milliGRAM(s) Oral every 6 hours PRN      LABS:                        10.8   18.26 )-----------( 268      ( 10 Dec 2020 06:48 )             33.6     Hgb Trend: 10.8<--, 10.7<--, 10.6<--, 11.0<--, 11.9<--  12-10    138  |  102  |  7   ----------------------------<  104<H>  3.3<L>   |  24  |  0.77    Ca    8.7      10 Dec 2020 14:36      Creatinine Trend: 0.77<--, 0.85<--, 0.82<--, 0.96<--, 0.97<--            MICROBIOLOGY:     Culture - Blood (collected 08 Dec 2020 18:25)  Source: .Blood Blood-Peripheral  Preliminary Report (09 Dec 2020 19:02):    No growth to date.    Culture - Blood (collected 08 Dec 2020 18:25)  Source: .Blood Blood-Peripheral  Preliminary Report (09 Dec 2020 19:01):    No growth to date.    Culture - Urine (collected 08 Dec 2020 08:50)  Source: .Urine Clean Catch (Midstream)  Final Report (09 Dec 2020 09:27):    No growth    Culture - Urine (collected 07 Dec 2020 16:31)  Source: Kidney LEFT KIDNEY URINE CULTURE  Final Report (09 Dec 2020 20:56):    No growth at 48 hours        RADIOLOGY:  < from: CT Angio Chest w/ IV Cont (12.08.20 @ 18:01) >    EXAM:  CT ANGIO CHEST (W)AW IC        PROCEDURE DATE:  Dec  8 2020         INTERPRETATION:  CLINICAL INFORMATION: Pleuritic chest pain after ureteral stent, leukocytosis    COMPARISON: Chest x-ray 12/7/2020 CT abdomen pelvis 12/7/2020    PROCEDURE:  CT Angiography of the Chest.  90 ml of Omnipaque 350 was injected intravenously. 10 ml were discarded.  Sagittal and coronal reformats were performed as well as 3D (MIP) reconstructions.    FINDINGS:    LUNGS AND AIRWAYS: Patent central airways.  Patchy opacities are present within the right middle lobe, left upper and both lower lobes, more so within the left lower lobe. The findings are new when compared to CT scan of the abdomen of 12/7/2020.  PLEURA: No pleural effusion.  MEDIASTINUM AND NOLBERTO: No lymphadenopathy.  VESSELS: Pulmonary artery is normal in caliber. No filling defects are noted. Left vertebral artery originates off the aortic arch, common normal variant.  HEART: Heart size is normal. No pericardial effusion.  CHEST WALL AND LOWER NECK: Within normal limits.  VISUALIZED UPPER ABDOMEN: The high attenuation lesion lesion measuring 1.8 x 2.2 cm is noted in the liver. For complete interpretation of the abdominal contents, please see the report of the CT scan of the abdomen performed on 12/7/2020. Left ureteral stent.  BONES: Within normal limits.    IMPRESSION:    No pulmonary embolism.    Patchy opacities are present within the right middle lobe, left upper lobe and both lower lobes. The findings are new when compared to study of 12/7/2020. Exact etiology is unclear.    2.2 cm high attenuation lesion is noted within the liver. MRI of the liver is with intravenous contrast is recommended for further evaluation.        CARLOS LUNA MD, Resident Radiology  This document has been electronically signed.  ERNESTO TURNER MD; Attending Radiologist  This document has been electronically signed. Dec  9 2020  7:58AM    < end of copied text >

## 2020-12-10 NOTE — PROGRESS NOTE ADULT - ASSESSMENT
25F no PMH w/7mm left mid ureteral stone s/p L stent placement  -- pt doing, w/SOB, CP, yesterday - CTangio negative, although w b/l sided patchy opacities  -- UCx growing Proteus, broadened to Zosyn for possible PNA coverage  -- COVID negative x3  -- IS/OOB  -- appreciate hospitalist recs  -- rpt CXR today 25F no PMH w/7mm left mid ureteral stone s/p L stent placement  -- pt doing, w/SOB, CP, yesterday - CTangio negative, although w b/l sided patchy opacities  -- UCx growing Proteus, broadened to Zosyn for possible PNA coverage  -- COVID negative x3  -- IS/OOB  -- appreciate hospitalist recs

## 2020-12-10 NOTE — CONSULT NOTE ADULT - ASSESSMENT
25F with no PMH admitted with L pyelonephritis s/p ureteral stent 12/9 for 7 mm mid ureteral stone with urine culture growing proteus, pulmonology consulted for mild hypoxemia requiring 1L NC at rest. After having her ureteral stent 12/9, pt developed cough and shortness of breath and CTA chest performed 12/8 consistent with multifocal pneumonia, likely aspiratory in setting of recent procedure.    Problems:  Multifocal Pneumonia  Hypoxemia    Recommendations  - Continue zosyn q8h, she will need at last 7 days of antibiotics for pneumonia, will likely be on longer course for pyelonephritis  - Check sputum culture, can likely transition to levofloxacin on discharge  - Supplemental O2 if needed to maintain O2 saturation > 92%  - Ambulate to ensure no need for home O2 before discharge    Hipolito Mckeon MD  Fellow, Pulmonary and Critical Care Medicine  [facility]  Pager: [phone], [phone] ([facility])  Email: [email or web address]     25F with no PMH admitted with L pyelonephritis s/p ureteral stent  for 7 mm mid ureteral stone with urine culture growing proteus, pulmonology consulted for mild hypoxemia requiring 1L NC at rest. After having her ureteral stent , pt developed cough and shortness of breath and CTA chest performed  consistent with multifocal pneumonia, likely aspiratory in setting of recent procedure.    Problems:  Multifocal Pneumonia  Hypoxemia    Recommendations  - Continue zosyn q8h, she will need at last 7 days of antibiotics for pneumonia, will likely be on longer course for pyelonephritis  - Check sputum culture, can likely transition to levofloxacin on discharge  - Supplemental O2 if needed to maintain O2 saturation > 92%  - Ambulate to ensure no need for home O2 before discharge  - Pulmonary will sign off, please call with further questions  - Patient can follow up with us outpatient at [address] [place] [address], [address]. Number [phone]. Appointments can also be made by e-mailing [email or web address] and providing patient's name, , and discharge diagnosis.      Hipolito Mckeon MD  Fellow, Pulmonary and Critical Care Medicine  [facility]  Pager: [phone], [phone] ([facility])  Email: [email or web address]

## 2020-12-10 NOTE — DISCHARGE NOTE PROVIDER - CARE PROVIDER_API CALL
Honorio Diggs  UROLOGY  [address] [place] [address], [place] [address]  Phone: [phone]  Fax: [phone]  Follow Up Time:

## 2020-12-10 NOTE — DISCHARGE NOTE PROVIDER - HOSPITAL COURSE
25F year old femalw with no sig PMH, presents with severe sepsis from UTI, found to have Left-sided obstructive uropathy with mild to moderate left hydroureteronephrosis to the level of a 6 mm mid ureteral stone.  Patient admitted to medicine service for continued monitoring and treatment.      Hypoxia.    -CT B patchy opacities - in setting of severe sepsis presumed from ureteral stone s/p stent, fluid resuscitation  -COVID negative x3.  Hypoxia since PACU, no PE, possible aspiration event - pulm eval appreciated.  -She is no longer hypoxic at rest and on exertion. ECHO unremarkable. Elev BNP likely 2' sepsis.  -Cont abx for suspect aspiration PNA and UTI.     Urinary tract infection without hematuria, site unspecified.    - Left-sided obstructive uropathy with mild to moderate left hydroureteronephrosis to the level of a 6 mm mid ureteral stone, s/p L ureteral stent with purulence from UO on 12/7/20  - urine growing >100,000 Proteus, blood cultures remain negative  - Transition to levaquin -- plan for total of 14 days of therapy inclusive of what was received inpt.     Sepsis, due to unspecified organism, unspecified whether acute organ dysfunction present.    - severe sepsis - AFeb  - COVID negative x3  -Management as above.     Lesion of liver  -Incidental finding on CT. Further investigation w/ MR as outpatient. This was discussed with pt. She understood and agreed w/ plan.     On 12/12/2020 patient medically clear for discharge home by Dr. Jones   25F year old female with no sig PMH, presents with severe sepsis from UTI, found to have Left-sided obstructive uropathy with mild to moderate left hydroureteronephrosis to the level of a 6 mm mid ureteral stone.  Patient admitted to medicine service for continued monitoring and treatment.      Hypoxia.    -CT B patchy opacities - in setting of severe sepsis presumed from ureteral stone s/p stent, fluid resuscitation  -COVID negative x3.  Hypoxia since PACU, no PE, possible aspiration event - pulm eval appreciated.  -She is no longer hypoxic at rest and on exertion. ECHO unremarkable. Elev BNP likely 2' sepsis.  -Cont abx for suspect aspiration PNA and UTI.     Urinary tract infection without hematuria, site unspecified.    - Left-sided obstructive uropathy with mild to moderate left hydroureteronephrosis to the level of a 6 mm mid ureteral stone, s/p L ureteral stent with purulence from UO on 12/7/20  - urine growing >100,000 Proteus, blood cultures remain negative  - Transition to levaquin -- plan for total of 14 days of therapy inclusive of what was received inpt.     Sepsis, due to unspecified organism, unspecified whether acute organ dysfunction present.    - severe sepsis - AFeb  - COVID negative x3  -Management as above.     Lesion of liver  -Incidental finding on CT. Further investigation w/ MR as outpatient. This was discussed with pt. She understood and agreed w/ plan.     On 12/12/2020 patient medically clear for discharge home by Dr. Jones. Email sent to [facility] as per patient request for follow up. 25F year old female with no sig PMH, presents with severe sepsis from UTI, found to have Left-sided obstructive uropathy with mild to moderate left hydroureteronephrosis to the level of a 6 mm mid ureteral stone.  Patient admitted to medicine service for continued monitoring and treatment.      Hypoxia.    -CT B patchy opacities - in setting of severe sepsis presumed from ureteral stone s/p stent, fluid resuscitation  -COVID negative x3.  Hypoxia since PACU, no PE, possible aspiration event - pulm eval appreciated.  -She is no longer hypoxic at rest and on exertion. ECHO unremarkable. Elev BNP likely 2' sepsis.  -Sputum cx neg. Cont abx for possible aspiration PNA, though lower suspicion at this point.     Urinary tract infection without hematuria, site unspecified.    - Left-sided obstructive uropathy with mild to moderate left hydroureteronephrosis to the level of a 6 mm mid ureteral stone, s/p L ureteral stent with purulence from UO on 12/7/20  - urine growing >100,000 Proteus, blood cultures remain negative  - Transition to levaquin -- plan for total of 14 days of therapy inclusive of what was received inpt.     Sepsis, due to unspecified organism, unspecified whether acute organ dysfunction present.    - severe sepsis - AFeb  - COVID negative x3  -Management as above.     Lesion of liver  -Incidental finding on CT. Further investigation w/ MR as outpatient. This was discussed with pt. She understood and agreed w/ plan.     On 12/12/2020 patient medically clear for discharge home by Dr. Jones. Email sent to [facility] as per patient request for follow up.

## 2020-12-10 NOTE — DISCHARGE NOTE PROVIDER - NSDCMRMEDTOKEN_GEN_ALL_CORE_FT
clindamycin 150 mg oral capsule: 3 cap(s) orally 3 times a day   levoFLOXacin 750 mg oral tablet: 1 tab(s) orally once a day   pantoprazole 40 mg oral delayed release tablet: 1 tab(s) orally once a day (before a meal)  tamsulosin 0.4 mg oral capsule: 1 cap(s) orally once a day (at bedtime)

## 2020-12-10 NOTE — PROGRESS NOTE ADULT - SUBJECTIVE AND OBJECTIVE BOX
Subjective  Febrile overnight to 101.5. Pt still w/some phlegm. Was on NC briefly overnight, now off. Pt otherwise has been OOB.    Objective    Vital signs  T(F): , Max: 101.5 (12-09-20 @ 21:33)  HR: 98 (12-10-20 @ 05:02)  BP: 124/91 (12-10-20 @ 05:02)  SpO2: 95% (12-10-20 @ 05:02)  Wt(kg): --    Output     12-08 @ 07:01  -  12-09 @ 07:00  --------------------------------------------------------  IN: 0 mL / OUT: 3700 mL / NET: -3700 mL    12-09 @ 07:01  -  12-10 @ 06:53  --------------------------------------------------------  IN: 0 mL / OUT: 4550 mL / NET: -4550 mL        Gen: NAD  Abd: soft, NT, ND    Labs      12-09 @ 07:40    WBC 26.42 / Hct 33.0  / SCr 0.85     12-08 @ 07:20    WBC 31.81 / Hct 34.4  / SCr --       Imaging

## 2020-12-10 NOTE — PROGRESS NOTE ADULT - PROBLEM SELECTOR PLAN 1
CT B patchy opacities - in setting of severe sepsis presumed from ureteral stone s/p stent, fluid resuscitation, COVID negative x3.  Hypoxia since PACU, no PE, possible aspiration event - pulm eval, on zosyn, check urine legionella, add azithro, check proBNP  will transfer to medicine, ACP team

## 2020-12-10 NOTE — DISCHARGE NOTE PROVIDER - NSDCCPCAREPLAN_GEN_ALL_CORE_FT
PRINCIPAL DISCHARGE DIAGNOSIS  Diagnosis: Kidney stone  Assessment and Plan of Treatment: You may have intermittent blood tinged urine and slight flank pain when you urinate.  This is normal and due to the stent in your ureter.   If your urine becomes bright red or with clots, please call the office.  You have a ureteral stent to help with tissue healing and drainage of the kidney. Take flomax daily for stent discomfort. The stent is temporary, and must be eventually removed or it may cause problems with infection and kidney damage if left in place greater than 3 months.  Call Dr. Fields or Dr. Diggs's office to schedule a follow up appointment.  Call the office if you have fever greater than 101, difficulty urinating, pain not relieved with pain medication, nausea/vomiting.        SECONDARY DISCHARGE DIAGNOSES  Diagnosis: Sepsis, due to unspecified organism, unspecified whether acute organ dysfunction present  Assessment and Plan of Treatment:     Diagnosis: Lesion of liver  Assessment and Plan of Treatment: A small lesion on your liver was seen on the CT scan.  You need to follow up with your primary care provider for an MRI of your liver for further evaluation.    Diagnosis: Fever  Assessment and Plan of Treatment:      PRINCIPAL DISCHARGE DIAGNOSIS  Diagnosis: Kidney stone  Assessment and Plan of Treatment: You may have intermittent blood tinged urine and slight flank pain when you urinate.  This is normal and due to the stent in your ureter.   If your urine becomes bright red or with clots, please call the office.  You have a ureteral stent to help with tissue healing and drainage of the kidney. Take flomax (tamsulosin) daily for stent discomfort. The stent is temporary, and must be eventually removed or it may cause problems with infection and kidney damage if left in place greater than 3 months.  Call Dr. Fields or Dr. Diggs's office to schedule a follow up appointment for stent/stone removal and further management.  Call the office if you have fever greater than 101, difficulty urinating, pain not relieved with pain medication, nausea/vomiting.        SECONDARY DISCHARGE DIAGNOSES  Diagnosis: Sepsis, due to unspecified organism, unspecified whether acute organ dysfunction present  Assessment and Plan of Treatment:     Diagnosis: Lesion of liver  Assessment and Plan of Treatment: A small lesion on your liver was seen on the CT scan.  You need to follow up with your primary care provider for an MRI of your liver for further evaluation.    Diagnosis: Fever  Assessment and Plan of Treatment:      PRINCIPAL DISCHARGE DIAGNOSIS  Diagnosis: Kidney stone  Assessment and Plan of Treatment: You may have intermittent blood tinged urine and slight flank pain when you urinate.  This is normal and due to the stent in your ureter.   If your urine becomes bright red or with clots, please call the office.  You have a ureteral stent to help with tissue healing and drainage of the kidney. Take flomax (tamsulosin) daily for stent discomfort. The stent is temporary, and must be eventually removed or it may cause problems with infection and kidney damage if left in place greater than 3 months.  Call Dr. Diggs's office to schedule a follow up appointment for stent/stone removal and further management.  Call the office if you have fever greater than 101, difficulty urinating, pain not relieved with pain medication, nausea/vomiting.        SECONDARY DISCHARGE DIAGNOSES  Diagnosis: Sepsis, due to unspecified organism, unspecified whether acute organ dysfunction present  Assessment and Plan of Treatment:     Diagnosis: Lesion of liver  Assessment and Plan of Treatment: A small lesion on your liver was seen on the CT scan.  You need to follow up with your primary care provider for an MRI of your liver for further evaluation.    Diagnosis: Fever  Assessment and Plan of Treatment:      PRINCIPAL DISCHARGE DIAGNOSIS  Diagnosis: Kidney stone  Assessment and Plan of Treatment: You may have intermittent blood tinged urine and slight flank pain when you urinate.  This is normal and due to the stent in your ureter.   If your urine becomes bright red or with clots, please call the office.  You have a ureteral stent to help with tissue healing and drainage of the kidney. Take flomax (tamsulosin) daily for stent discomfort. The stent is temporary, and must be eventually removed or it may cause problems with infection and kidney damage if left in place greater than 3 months.  Call Dr. Diggs's office to schedule a follow up appointment for stent/stone removal and further management.  Call the office if you have fever greater than 101, difficulty urinating, pain not relieved with pain medication, nausea/vomiting.  --Plan for cystoscopy, left ureteroscopy, laser lithotripsy, stone extraction, stent exchange 1/13  --Call  to confirm OR date        SECONDARY DISCHARGE DIAGNOSES  Diagnosis: Sepsis, due to unspecified organism, unspecified whether acute organ dysfunction present  Assessment and Plan of Treatment:     Diagnosis: Lesion of liver  Assessment and Plan of Treatment: A small lesion on your liver was seen on the CT scan.  You need to follow up with your primary care provider for an MRI of your liver for further evaluation.    Diagnosis: Fever  Assessment and Plan of Treatment:

## 2020-12-10 NOTE — PROGRESS NOTE ADULT - SUBJECTIVE AND OBJECTIVE BOX
[facility] Division of Hospital Medicine  Gilda Kessler MD  Pager (M-F, 8A-5P):  In-house pager [phone]; Long-range pager [phone]  Other Times:  Please page Hospitalist in Charge -  In-house pager [phone]    Patient is a 25y old  Female who presents with a chief complaint of urosepsis (09 Dec 2020 12:51)    SUBJECTIVE / OVERNIGHT EVENTS: Feeling better than yesterday, less achy, tired.  Still requiring oxygen.  Cough.   ADDITIONAL REVIEW OF SYSTEMS:    MEDICATIONS  (STANDING):  azithromycin  IVPB      azithromycin  IVPB 500 milliGRAM(s) IV Intermittent once  heparin   Injectable 5000 Unit(s) SubCutaneous every 8 hours  influenza   Vaccine 0.5 milliLiter(s) IntraMuscular once  pantoprazole    Tablet 40 milliGRAM(s) Oral before breakfast  piperacillin/tazobactam IVPB.. 3.375 Gram(s) IV Intermittent every 8 hours  polyethylene glycol 3350 17 Gram(s) Oral daily  senna 2 Tablet(s) Oral at bedtime  tamsulosin 0.4 milliGRAM(s) Oral at bedtime    MEDICATIONS  (PRN):  acetaminophen   Tablet .. 650 milliGRAM(s) Oral every 6 hours PRN Temp greater or equal to 38C (100.4F), Mild Pain (1 - 3)    I&O's Summary    09 Dec 2020 07:01  -  10 Dec 2020 07:00  --------------------------------------------------------  IN: 0 mL / OUT: 4550 mL / NET: -4550 mL    PHYSICAL EXAM:  Vital Signs Last 24 Hrs  T(C): 37.4 (10 Dec 2020 09:26), Max: 38.6 (09 Dec 2020 21:33)  T(F): 99.3 (10 Dec 2020 09:26), Max: 101.5 (09 Dec 2020 21:33)  HR: 101 (10 Dec 2020 09:26) (98 - 115)  BP: 133/76 (10 Dec 2020 09:26) (120/76 - 133/76)  BP(mean): --  RR: 19 (10 Dec 2020 09:26) (19 - 20)  SpO2: 95% (10 Dec 2020 09:26) (95% - 97%)    GENERAL: sitting up in bed, more comfortable  RESPIRATORY: dec BS/rhonci   CARDIOVASCULAR: Regular rate and rhythm; No murmurs, rubs, or gallops  GASTROINTESTINAL: Soft, Nontender, Nondistended; Bowel sounds present  EXTREMITIES:  2+ Peripheral Pulses, No clubbing, cyanosis, or edema  PSYCH: calm, appropriate    LABS:                        10.8   18.26 )-----------( 268      ( 10 Dec 2020 06:48 )             33.6     12-09    138  |  104  |  8   ----------------------------<  87  3.6   |  25  |  0.85    Ca    8.4      09 Dec 2020 07:40    Culture - Blood (collected 08 Dec 2020 18:25)  Source: .Blood Blood-Peripheral  Preliminary Report (09 Dec 2020 19:02):    No growth to date.    Culture - Blood (collected 08 Dec 2020 18:25)  Source: .Blood Blood-Peripheral  Preliminary Report (09 Dec 2020 19:01):    No growth to date.    Culture - Urine (collected 08 Dec 2020 08:50)  Source: .Urine Clean Catch (Midstream)  Final Report (09 Dec 2020 09:27):    No growth    Culture - Urine (collected 07 Dec 2020 16:31)  Source: Kidney LEFT KIDNEY URINE CULTURE  Final Report (09 Dec 2020 20:56):    No growth at 48 hours    Culture - Urine (12.06.20 @ 23:15)    -  Amikacin: S <=16    -  Amoxicillin/Clavulanic Acid: S <=8/4    -  Ampicillin: S <=8 These ampicillin results predict results for amoxicillin    -  Ampicillin/Sulbactam: S <=4/2 Enterobacter, Citrobacter, and Serratia may develop resistance during prolonged therapy (3-4 days)    -  Aztreonam: S <=4    -  Cefazolin: S <=2 (MIC_CL_COM_ENTERIC_CEFAZU) For uncomplicated UTI with K. pneumoniae, E. coli, or P. mirablis: JERILYN <=16 is sensitive and JERILYN >=32 is resistant. This also predicts results for oral agents cefaclor, cefdinir, cefpodoxime, cefprozil, cefuroxime axetil, cephalexin and locarbef for uncomplicated UTI. Note that some isolates may be susceptible to these agents while testing resistant to cefazolin.    -  Cefepime: S <=2    -  Cefoxitin: S <=8    -  Ceftriaxone: S <=1 Enterobacter, Citrobacter, and Serratia may develop resistance during prolonged therapy    -  Ciprofloxacin: S <=0.25    -  Ertapenem: S <=0.5    -  Gentamicin: S <=2    -  Levofloxacin: S <=0.5    -  Meropenem: S <=1    -  Nitrofurantoin: R 64 Should not be used to treat pyelonephritis    -  Piperacillin/Tazobactam: S <=8    -  Tobramycin: S <=2    -  Trimethoprim/Sulfamethoxazole: S <=0.5/9.5    Specimen Source: .Urine Clean Catch (Midstream)    Culture Results:   >100,000 CFU/ml Proteus mirabilis  <10,000 CFU/ml Normal Urogenital madison present    Organism Identification: Proteus mirabilis    Organism: Proteus mirabilis    Method Type: JERILYN    RADIOLOGY & ADDITIONAL TESTS:  Results Reviewed:   Imaging Personally Reviewed:  Electrocardiogram Personally Reviewed:    COORDINATION OF CARE:  Care Discussed with Consultants/Other Providers [Y/N]: urology re overall care  Prior or Outpatient Records Reviewed [Y/N]:

## 2020-12-10 NOTE — CONSULT NOTE ADULT - ATTENDING COMMENTS
Agree with plan as outlined above. Patient seen and examined at bedside. Patient history, laboratory data, and imaging personally reviewed.    Pt is a 25F with no PMHx p/w for left pyelonephritis s/p ureteral stent placement on 12/9 with hospital course c/b hypoxemic respiratory distress with mild new oxygen requirements likely 2/2 multifocal pneumonia in the setting of recent surgical procedure. Pt comfortable appearing and in no respiratory distress, currently on RA at rest with O2 sat 100%. Recommend continuation of current abx regimen. Sputum cx sent and pending. Pt will likely need to complete 7 day course of abx for pneumonia, can re-adjust medications pending cx results. Will need ambulatory and rest O2 saturation prior to hospital discharge.

## 2020-12-10 NOTE — PROGRESS NOTE ADULT - PROBLEM SELECTOR PLAN 2
Left-sided obstructive uropathy with mild to moderate left hydroureteronephrosis to the level of a 6 mm mid ureteral stone, s/p L ureteral stent with purulence from UO on 12/7/20  - urine growing >100,000 Proteus, sens ceftriaxone, blood cultures remain negative

## 2020-12-11 DIAGNOSIS — K76.9 LIVER DISEASE, UNSPECIFIED: ICD-10-CM

## 2020-12-11 LAB
ANION GAP SERPL CALC-SCNC: 13 MMOL/L — SIGNIFICANT CHANGE UP (ref 7–14)
BUN SERPL-MCNC: 9 MG/DL — SIGNIFICANT CHANGE UP (ref 7–23)
CALCIUM SERPL-MCNC: 8.9 MG/DL — SIGNIFICANT CHANGE UP (ref 8.4–10.5)
CHLORIDE SERPL-SCNC: 102 MMOL/L — SIGNIFICANT CHANGE UP (ref 98–107)
CO2 SERPL-SCNC: 22 MMOL/L — SIGNIFICANT CHANGE UP (ref 22–31)
CREAT SERPL-MCNC: 0.75 MG/DL — SIGNIFICANT CHANGE UP (ref 0.5–1.3)
GLUCOSE SERPL-MCNC: 80 MG/DL — SIGNIFICANT CHANGE UP (ref 70–99)
HCT VFR BLD CALC: 34.2 % — LOW (ref 34.5–45)
HGB BLD-MCNC: 10.9 G/DL — LOW (ref 11.5–15.5)
LEGIONELLA AG UR QL: NEGATIVE — SIGNIFICANT CHANGE UP
MAGNESIUM SERPL-MCNC: 1.9 MG/DL — SIGNIFICANT CHANGE UP (ref 1.6–2.6)
MCHC RBC-ENTMCNC: 27.6 PG — SIGNIFICANT CHANGE UP (ref 27–34)
MCHC RBC-ENTMCNC: 31.9 GM/DL — LOW (ref 32–36)
MCV RBC AUTO: 86.6 FL — SIGNIFICANT CHANGE UP (ref 80–100)
NRBC # BLD: 0 /100 WBCS — SIGNIFICANT CHANGE UP
NRBC # FLD: 0 K/UL — SIGNIFICANT CHANGE UP
PHOSPHATE SERPL-MCNC: 3.4 MG/DL — SIGNIFICANT CHANGE UP (ref 2.5–4.5)
PLATELET # BLD AUTO: 328 K/UL — SIGNIFICANT CHANGE UP (ref 150–400)
POTASSIUM SERPL-MCNC: 3.9 MMOL/L — SIGNIFICANT CHANGE UP (ref 3.5–5.3)
POTASSIUM SERPL-SCNC: 3.9 MMOL/L — SIGNIFICANT CHANGE UP (ref 3.5–5.3)
RBC # BLD: 3.95 M/UL — SIGNIFICANT CHANGE UP (ref 3.8–5.2)
RBC # FLD: 13.2 % — SIGNIFICANT CHANGE UP (ref 10.3–14.5)
SODIUM SERPL-SCNC: 137 MMOL/L — SIGNIFICANT CHANGE UP (ref 135–145)
WBC # BLD: 15.82 K/UL — HIGH (ref 3.8–10.5)
WBC # FLD AUTO: 15.82 K/UL — HIGH (ref 3.8–10.5)

## 2020-12-11 PROCEDURE — 99232 SBSQ HOSP IP/OBS MODERATE 35: CPT

## 2020-12-11 PROCEDURE — 93306 TTE W/DOPPLER COMPLETE: CPT | Mod: 26

## 2020-12-11 RX ADMIN — AZITHROMYCIN 255 MILLIGRAM(S): 500 TABLET, FILM COATED ORAL at 11:30

## 2020-12-11 RX ADMIN — HEPARIN SODIUM 5000 UNIT(S): 5000 INJECTION INTRAVENOUS; SUBCUTANEOUS at 21:32

## 2020-12-11 RX ADMIN — HEPARIN SODIUM 5000 UNIT(S): 5000 INJECTION INTRAVENOUS; SUBCUTANEOUS at 13:14

## 2020-12-11 RX ADMIN — PIPERACILLIN AND TAZOBACTAM 25 GRAM(S): 4; .5 INJECTION, POWDER, LYOPHILIZED, FOR SOLUTION INTRAVENOUS at 21:33

## 2020-12-11 RX ADMIN — TAMSULOSIN HYDROCHLORIDE 0.4 MILLIGRAM(S): 0.4 CAPSULE ORAL at 21:33

## 2020-12-11 RX ADMIN — POLYETHYLENE GLYCOL 3350 17 GRAM(S): 17 POWDER, FOR SOLUTION ORAL at 11:31

## 2020-12-11 RX ADMIN — PANTOPRAZOLE SODIUM 40 MILLIGRAM(S): 20 TABLET, DELAYED RELEASE ORAL at 06:13

## 2020-12-11 RX ADMIN — PIPERACILLIN AND TAZOBACTAM 25 GRAM(S): 4; .5 INJECTION, POWDER, LYOPHILIZED, FOR SOLUTION INTRAVENOUS at 06:13

## 2020-12-11 RX ADMIN — PIPERACILLIN AND TAZOBACTAM 25 GRAM(S): 4; .5 INJECTION, POWDER, LYOPHILIZED, FOR SOLUTION INTRAVENOUS at 14:47

## 2020-12-11 RX ADMIN — HEPARIN SODIUM 5000 UNIT(S): 5000 INJECTION INTRAVENOUS; SUBCUTANEOUS at 06:13

## 2020-12-11 NOTE — PROGRESS NOTE ADULT - PROBLEM SELECTOR PLAN 1
CT B patchy opacities - in setting of severe sepsis presumed from ureteral stone s/p stent, fluid resuscitation, COVID negative x3.  Hypoxia since PACU, no PE, possible aspiration event - pulm mike appreciated, on zosyn/azithro, check urine legionella.   -Follow up sputum cx - growing GP in pairs.  -Elevated BNP nonspecific marker. No evidence of volume overload on exam. ECHO also unremarkable. BNP elevation can occur in setting of sepsis/infection as well. Will monitor for now.  -Will check ambulatory O2 sat.

## 2020-12-11 NOTE — PROGRESS NOTE ADULT - SUBJECTIVE AND OBJECTIVE BOX
[facility] Division of Hospital Medicine  Ayad LAUREN StephanieElías) MD Robert  Pager [phone]    SUBJECTIVE:  Follow up for severe sepsis 2' renal stone, UTI.    Pt seen and evaluated at bedside this AM. Febrile last night to 101.2F. Denies any SOB/CP/NV/chills/dysuria/diarrhea. Tolerating PO intake w/o issues. Comfortable off O2.       ROS: All systems negative except as noted.      Vital Signs Last 24 Hrs  T(C): 36.8 (11 Dec 2020 09:57), Max: 38.4 (10 Dec 2020 21:01)  T(F): 98.2 (11 Dec 2020 09:57), Max: 101.2 (10 Dec 2020 21:01)  HR: 84 (11 Dec 2020 09:57) (84 - 100)  BP: 122/83 (11 Dec 2020 09:57) (118/67 - 129/86)  BP(mean): --  RR: 18 (11 Dec 2020 09:57) (17 - 18)  SpO2: 100% (11 Dec 2020 09:57) (94% - 100%)      PHYSICAL EXAM:  Gen- In bed, comfortable, NAD  Eyes- EOMI, PERRLA, nonicteric.  EMNT- Fair dentition. MMM. No tonsilar exudates. No posterior pharynx erythema.  Neck- Supple. No masses. No tracheal deviation. No JVD  Resp- CTAB, good effort. No r/r/w. No accessory muscle use.  CVS- RRR, S1S2, no g/r/m. No LE edema.  GI- Soft abd, NT, ND, +BSx4. No HSM.  MSK- No C/C. ROM intact. No crepitus.  Neuro- CN II-XII intact. Speech fluent/face symmetric. Sensation intact.  Skin- No rashes/ulcers. Warm/moist.  Psych- AAOx3. Appropriate mood/affect.      MEDICATION:  MEDICATIONS  (STANDING):  azithromycin  IVPB      azithromycin  IVPB 500 milliGRAM(s) IV Intermittent every 24 hours  heparin   Injectable 5000 Unit(s) SubCutaneous every 8 hours  influenza   Vaccine 0.5 milliLiter(s) IntraMuscular once  pantoprazole    Tablet 40 milliGRAM(s) Oral before breakfast  piperacillin/tazobactam IVPB.. 3.375 Gram(s) IV Intermittent every 8 hours  polyethylene glycol 3350 17 Gram(s) Oral daily  senna 2 Tablet(s) Oral at bedtime  tamsulosin 0.4 milliGRAM(s) Oral at bedtime    MEDICATIONS  (PRN):  acetaminophen   Tablet .. 650 milliGRAM(s) Oral every 6 hours PRN Temp greater or equal to 38C (100.4F), Mild Pain (1 - 3)            LABORATORY:                          10.9   15.82 )-----------( 328      ( 11 Dec 2020 08:11 )             34.2     12-11    137  |  102  |  9   ----------------------------<  80  3.9   |  22  |  0.75    Ca    8.9      11 Dec 2020 08:11  Phos  3.4     12-11  Mg     1.9     12-11

## 2020-12-11 NOTE — PROGRESS NOTE ADULT - SUBJECTIVE AND OBJECTIVE BOX
Subjective  Pt febrile overnight 101.2. This AM, pt feels much better. Off NC    Objective    Vital signs  T(F): , Max: 101.2 (12-10-20 @ 21:01)  HR: 95 (12-11-20 @ 06:08)  BP: 122/73 (12-11-20 @ 06:08)  SpO2: 94% (12-11-20 @ 06:08)  Wt(kg): --    Output     12-10 @ 07:01  -  12-11 @ 07:00  --------------------------------------------------------  IN: 0 mL / OUT: 2250 mL / NET: -2250 mL        Gen: NAD  Abd: soft, NT, ND    Labs      12-10 @ 14:36    WBC --    / Hct --    / SCr 0.77     12-10 @ 06:48    WBC 18.26 / Hct 33.6  / SCr --       Culture - Sputum . (12.10.20 @ 18:31)   Gram Stain:   Few polymorphonuclear leukocytes per low power field   Few Squamous epithelial cells per low power field   Few Gram positive cocci in pairs per oil power field   Specimen Source: .Sputum Sputum     Imaging

## 2020-12-11 NOTE — PROGRESS NOTE ADULT - ASSESSMENT
25F no PMH w/7mm left mid ureteral stone s/p L stent placement also w/PNA  -- pt improving on azithromycin, CTangio negative for PE, although w b/l sided patchy opacities seen on latest CXR  -- UCx growing Proteus, can deescalate   -- azithromycin for PNA per medicine  -- COVID negative x3  -- IS/OOB  -- appreciate hospitalist recs  -- plan for cystoscopy, left ureteroscopy, laser lithotripsy, stone extraction, stent exchange 1/13  -- pt can call  to confirm OR date  -- please call if questions

## 2020-12-11 NOTE — PROGRESS NOTE ADULT - PROBLEM SELECTOR PLAN 2
Left-sided obstructive uropathy with mild to moderate left hydroureteronephrosis to the level of a 6 mm mid ureteral stone, s/p L ureteral stent with purulence from UO on 12/7/20  - urine growing >100,000 Proteus, blood cultures remain negative  - Will keep zosyn for now pending sputum cx. Will narrow coverage as pt improves and speciation/sensitivities retur .

## 2020-12-11 NOTE — PROGRESS NOTE ADULT - PROBLEM SELECTOR PLAN 3
2 severe sepsis - improving, cont Zosyn, azithro  - w/ recent fever- hold off transitioning to PO for now.  - COVID negative x3

## 2020-12-12 ENCOUNTER — TRANSCRIPTION ENCOUNTER (OUTPATIENT)
Age: 25
End: 2020-12-12

## 2020-12-12 VITALS
TEMPERATURE: 98 F | OXYGEN SATURATION: 98 % | DIASTOLIC BLOOD PRESSURE: 70 MMHG | RESPIRATION RATE: 18 BRPM | SYSTOLIC BLOOD PRESSURE: 109 MMHG | HEART RATE: 84 BPM

## 2020-12-12 LAB
ANION GAP SERPL CALC-SCNC: 11 MMOL/L — SIGNIFICANT CHANGE UP (ref 7–14)
BUN SERPL-MCNC: 8 MG/DL — SIGNIFICANT CHANGE UP (ref 7–23)
CALCIUM SERPL-MCNC: 9.2 MG/DL — SIGNIFICANT CHANGE UP (ref 8.4–10.5)
CHLORIDE SERPL-SCNC: 98 MMOL/L — SIGNIFICANT CHANGE UP (ref 98–107)
CO2 SERPL-SCNC: 24 MMOL/L — SIGNIFICANT CHANGE UP (ref 22–31)
CREAT SERPL-MCNC: 0.79 MG/DL — SIGNIFICANT CHANGE UP (ref 0.5–1.3)
CULTURE RESULTS: SIGNIFICANT CHANGE UP
GLUCOSE SERPL-MCNC: 79 MG/DL — SIGNIFICANT CHANGE UP (ref 70–99)
HCT VFR BLD CALC: 35.1 % — SIGNIFICANT CHANGE UP (ref 34.5–45)
HGB BLD-MCNC: 11.1 G/DL — LOW (ref 11.5–15.5)
MAGNESIUM SERPL-MCNC: 2 MG/DL — SIGNIFICANT CHANGE UP (ref 1.6–2.6)
MCHC RBC-ENTMCNC: 27.1 PG — SIGNIFICANT CHANGE UP (ref 27–34)
MCHC RBC-ENTMCNC: 31.6 GM/DL — LOW (ref 32–36)
MCV RBC AUTO: 85.8 FL — SIGNIFICANT CHANGE UP (ref 80–100)
NRBC # BLD: 0 /100 WBCS — SIGNIFICANT CHANGE UP
NRBC # FLD: 0 K/UL — SIGNIFICANT CHANGE UP
PHOSPHATE SERPL-MCNC: 3.9 MG/DL — SIGNIFICANT CHANGE UP (ref 2.5–4.5)
PLATELET # BLD AUTO: 393 K/UL — SIGNIFICANT CHANGE UP (ref 150–400)
POTASSIUM SERPL-MCNC: 3.7 MMOL/L — SIGNIFICANT CHANGE UP (ref 3.5–5.3)
POTASSIUM SERPL-SCNC: 3.7 MMOL/L — SIGNIFICANT CHANGE UP (ref 3.5–5.3)
RBC # BLD: 4.09 M/UL — SIGNIFICANT CHANGE UP (ref 3.8–5.2)
RBC # FLD: 13.2 % — SIGNIFICANT CHANGE UP (ref 10.3–14.5)
SODIUM SERPL-SCNC: 133 MMOL/L — LOW (ref 135–145)
SPECIMEN SOURCE: SIGNIFICANT CHANGE UP
WBC # BLD: 14.29 K/UL — HIGH (ref 3.8–10.5)
WBC # FLD AUTO: 14.29 K/UL — HIGH (ref 3.8–10.5)

## 2020-12-12 PROCEDURE — 99239 HOSP IP/OBS DSCHRG MGMT >30: CPT

## 2020-12-12 RX ORDER — TAMSULOSIN HYDROCHLORIDE 0.4 MG/1
1 CAPSULE ORAL
Qty: 30 | Refills: 0
Start: 2020-12-12 | End: 2021-01-10

## 2020-12-12 RX ORDER — PANTOPRAZOLE SODIUM 20 MG/1
1 TABLET, DELAYED RELEASE ORAL
Qty: 30 | Refills: 0
Start: 2020-12-12 | End: 2021-01-10

## 2020-12-12 RX ORDER — CIPROFLOXACIN LACTATE 400MG/40ML
1 VIAL (ML) INTRAVENOUS
Qty: 12 | Refills: 0
Start: 2020-12-12 | End: 2020-12-23

## 2020-12-12 RX ADMIN — PANTOPRAZOLE SODIUM 40 MILLIGRAM(S): 20 TABLET, DELAYED RELEASE ORAL at 05:56

## 2020-12-12 RX ADMIN — HEPARIN SODIUM 5000 UNIT(S): 5000 INJECTION INTRAVENOUS; SUBCUTANEOUS at 05:56

## 2020-12-12 RX ADMIN — PIPERACILLIN AND TAZOBACTAM 25 GRAM(S): 4; .5 INJECTION, POWDER, LYOPHILIZED, FOR SOLUTION INTRAVENOUS at 05:56

## 2020-12-12 NOTE — DISCHARGE NOTE NURSING/CASE MANAGEMENT/SOCIAL WORK - PATIENT PORTAL LINK FT
You can access the FollowMyHealth Patient Portal offered by [facility] by registering at the following website: [email or web address]. By joining [employer]’s FollowMyHealth portal, you will also be able to view your health information using other applications (apps) compatible with our system.

## 2020-12-12 NOTE — PROGRESS NOTE ADULT - PROBLEM SELECTOR PLAN 4
Incidental finding on CT. Further investigation w/ MR as outpatient. This was discussed with pt. She understood and agreed w/ plan.

## 2020-12-12 NOTE — PROGRESS NOTE ADULT - PROBLEM SELECTOR PLAN 2
Left-sided obstructive uropathy with mild to moderate left hydroureteronephrosis to the level of a 6 mm mid ureteral stone, s/p L ureteral stent with purulence from UO on 12/7/20  - urine growing >100,000 Proteus, blood cultures remain negative  - Transition to levaquin -- plan for total of 14d of therapy inclusive of what was received here. Left-sided obstructive uropathy with mild to moderate left hydroureteronephrosis to the level of a 6 mm mid ureteral stone, s/p L ureteral stent with purulence from UO on 12/7/20  - urine growing >100,000 Proteus, blood cultures remain negative  - Transition to levaquin -- plan for total of 14d of therapy inclusive of what was received here.  -Uro f/u as OP.

## 2020-12-12 NOTE — PROGRESS NOTE ADULT - PROBLEM SELECTOR PLAN 1
CT B patchy opacities - in setting of severe sepsis presumed from ureteral stone s/p stent, fluid resuscitation, COVID negative x3.  Hypoxia since PACU, no PE, possible aspiration event - pulm eval appreciated.  -She is no longer hypoxic at rest and on exertion. ECHO unremarkable. Elev BNP likely 2' sepsis.  -Cont abx for suspect aspiration PNA and UTI. CT B patchy opacities - in setting of severe sepsis presumed from ureteral stone s/p stent, fluid resuscitation, COVID negative x3.  Hypoxia since PACU, no PE, possible aspiration event - pulm eval appreciated.  -She is no longer hypoxic at rest and on exertion. ECHO unremarkable. Elev BNP likely 2' sepsis.  -Sputum cx neg. Low suspicion for resp infection - Abx for UTI will cover respiratory pathogen though.

## 2020-12-12 NOTE — PROGRESS NOTE ADULT - SUBJECTIVE AND OBJECTIVE BOX
[facility] Division of Hospital Medicine  Ayad BrownElías) MD Robert  Pager [phone]    SUBJECTIVE:  Follow up for severe sepsis 2' renal stone, UTI.    Pt seen and evaluated at bedside this AM. Febrile last night to 101.2F. Denies any SOB/CP/NV/chills/dysuria/diarrhea. Tolerating PO intake w/o issues. Comfortable off O2.       ROS: All systems negative except as noted.      Vital Signs Last 24 Hrs  T(C): 36.9 (12 Dec 2020 10:07), Max: 37.2 (11 Dec 2020 13:13)  T(F): 98.5 (12 Dec 2020 10:07), Max: 99 (11 Dec 2020 17:41)  HR: 84 (12 Dec 2020 10:07) (81 - 111)  BP: 109/70 (12 Dec 2020 10:07) (109/70 - 131/72)  BP(mean): --  RR: 18 (12 Dec 2020 10:07) (17 - 19)  SpO2: 98% (12 Dec 2020 10:07) (93% - 98%)      PHYSICAL EXAM:  Gen- In bed, comfortable, NAD  Eyes- EOMI, PERRLA, nonicteric.  EMNT- Fair dentition. MMM. No tonsilar exudates. No posterior pharynx erythema.  Neck- Supple. No masses. No tracheal deviation. No JVD  Resp- CTAB, good effort. No r/r/w. No accessory muscle use.  CVS- RRR, S1S2, no g/r/m. No LE edema.  GI- Soft abd, NT, ND, +BSx4. No HSM.  MSK- No C/C. ROM intact. No crepitus.  Neuro- CN II-XII intact. Speech fluent/face symmetric. Sensation intact.  Skin- No rashes/ulcers. Warm/moist.  Psych- AAOx3. Appropriate mood/affect.      MEDICATION:  MEDICATIONS  (STANDING):  heparin   Injectable 5000 Unit(s) SubCutaneous every 8 hours  influenza   Vaccine 0.5 milliLiter(s) IntraMuscular once  pantoprazole    Tablet 40 milliGRAM(s) Oral before breakfast  piperacillin/tazobactam IVPB.. 3.375 Gram(s) IV Intermittent every 8 hours  polyethylene glycol 3350 17 Gram(s) Oral daily  senna 2 Tablet(s) Oral at bedtime  tamsulosin 0.4 milliGRAM(s) Oral at bedtime    MEDICATIONS  (PRN):  acetaminophen   Tablet .. 650 milliGRAM(s) Oral every 6 hours PRN Temp greater or equal to 38C (100.4F), Mild Pain (1 - 3)        LABORATORY:                          11.1   14.29 )-----------( 393      ( 12 Dec 2020 08:15 )             35.1     12-12    133<L>  |  98  |  8   ----------------------------<  79  3.7   |  24  |  0.79    Ca    9.2      12 Dec 2020 08:15  Phos  3.9     12-12  Mg     2.0     12-12                                   [facility] Division of Hospital Medicine  Ayad LAUREN StephanieElías) MD Robert  Pager [phone]    SUBJECTIVE:  Follow up for severe sepsis 2' renal stone, UTI.    Pt seen and evaluated at bedside this AM. Continues to improve. Tolerating PO intake. Ambulating w/o dyspnea. Voiding w/o dysuria. Has some pressure sensation, which is likely related to stent.       ROS: All systems negative except as noted.      Vital Signs Last 24 Hrs  T(C): 36.9 (12 Dec 2020 10:07), Max: 37.2 (11 Dec 2020 13:13)  T(F): 98.5 (12 Dec 2020 10:07), Max: 99 (11 Dec 2020 17:41)  HR: 84 (12 Dec 2020 10:07) (81 - 111)  BP: 109/70 (12 Dec 2020 10:07) (109/70 - 131/72)  BP(mean): --  RR: 18 (12 Dec 2020 10:07) (17 - 19)  SpO2: 98% (12 Dec 2020 10:07) (93% - 98%)      PHYSICAL EXAM:  Gen- In bed, comfortable, NAD  Eyes- EOMI, PERRLA, nonicteric.  EMNT- Fair dentition. MMM. No tonsilar exudates. No posterior pharynx erythema.  Neck- Supple. No masses. No tracheal deviation. No JVD  Resp- CTAB, good effort. No r/r/w. No accessory muscle use.  CVS- RRR, S1S2, no g/r/m. No LE edema.  GI- Soft abd, NT, ND, +BSx4. No HSM.  MSK- No C/C. ROM intact. No crepitus.  Neuro- CN II-XII intact. Speech fluent/face symmetric. Sensation intact.  Skin- No rashes/ulcers. Warm/moist.  Psych- AAOx3. Appropriate mood/affect.      MEDICATION:  MEDICATIONS  (STANDING):  heparin   Injectable 5000 Unit(s) SubCutaneous every 8 hours  influenza   Vaccine 0.5 milliLiter(s) IntraMuscular once  pantoprazole    Tablet 40 milliGRAM(s) Oral before breakfast  piperacillin/tazobactam IVPB.. 3.375 Gram(s) IV Intermittent every 8 hours  polyethylene glycol 3350 17 Gram(s) Oral daily  senna 2 Tablet(s) Oral at bedtime  tamsulosin 0.4 milliGRAM(s) Oral at bedtime    MEDICATIONS  (PRN):  acetaminophen   Tablet .. 650 milliGRAM(s) Oral every 6 hours PRN Temp greater or equal to 38C (100.4F), Mild Pain (1 - 3)        LABORATORY:                          11.1   14.29 )-----------( 393      ( 12 Dec 2020 08:15 )             35.1     12-12    133<L>  |  98  |  8   ----------------------------<  79  3.7   |  24  |  0.79    Ca    9.2      12 Dec 2020 08:15  Phos  3.9     12-12  Mg     2.0     12-12

## 2020-12-12 NOTE — PROGRESS NOTE ADULT - ASSESSMENT
25F no PMH w/7mm left mid ureteral stone s/p L stent placement also w/PNA  -- pt improving on azithromycin, CTangio negative for PE, although w b/l sided patchy opacities seen on latest CXR  -- Zosyn for PNA and UTI, frequency stent related  -- Blood Cx neg  -- COVID negative x3  -- IS/OOB  -- appreciate hospitalist recs  -- plan for cystoscopy, left ureteroscopy, laser lithotripsy, stone extraction, stent exchange 1/13  -- pt can call  to confirm OR date  -- please call if questions

## 2020-12-12 NOTE — PROGRESS NOTE ADULT - ASSESSMENT
25F no sig PMH, a/w severe sepsis from UTI, found to have Left-sided obstructive uropathy with mild to moderate left hydroureteronephrosis to the level of a 6 mm mid ureteral stone, s/p L ureteral stent with purulence from UO.  Slow to improve over course, which was complicated by hypoxia/tachycardia and persistent fever. Pt was on Zosyn which was broadened to Azithro. Pulmm c/s for hypoxia. NO significant findings. ECHO also negative. Elevated BNP likely 2' sepsis. Urine cx grew Proteus. BCx remains neg. Pt has remained afeb for nearly 48h. PLan for DC home to complete abx course.

## 2020-12-12 NOTE — PROGRESS NOTE ADULT - PROBLEM SELECTOR PLAN 3
severe sepsis - AFeb  - COVID negative x3  -Management as above. severe sepsis - AFeb. WBC downtrended. Clinically markedly improved.   - COVID negative x3  -Management as above.

## 2020-12-12 NOTE — PROGRESS NOTE ADULT - SUBJECTIVE AND OBJECTIVE BOX
Subjective  No overnight events. Pt remains afebrile and off O2. Doing well. Reports some frequency.    Objective    Vital signs  T(F): , Max: 99 (12-11-20 @ 17:41)  HR: 81 (12-12-20 @ 05:54)  BP: 112/56 (12-12-20 @ 05:54)  SpO2: 97% (12-12-20 @ 05:54)  Wt(kg): --    Output     12-11 @ 07:01  -  12-12 @ 07:00  --------------------------------------------------------  IN: 0 mL / OUT: 2600 mL / NET: -2600 mL        Gen: NAD  Abd: soft, NT, ND    Labs      12-11 @ 08:11    WBC 15.82 / Hct 34.2  / SCr 0.75     12-10 @ 14:36    WBC --    / Hct --    / SCr 0.77     Blood Cx: neg    Imaging

## 2020-12-12 NOTE — DISCHARGE NOTE NURSING/CASE MANAGEMENT/SOCIAL WORK - NSDCPNINST_GEN_ALL_CORE
notify MD of temp 101, chills, or shortness of breath.  Follow up with urology for stent removal  Follow up about liver MRI  Continue antibiotic as prescribed.

## 2020-12-13 LAB
CULTURE RESULTS: SIGNIFICANT CHANGE UP
CULTURE RESULTS: SIGNIFICANT CHANGE UP
SPECIMEN SOURCE: SIGNIFICANT CHANGE UP
SPECIMEN SOURCE: SIGNIFICANT CHANGE UP

## 2020-12-14 PROBLEM — Z00.00 ENCOUNTER FOR PREVENTIVE HEALTH EXAMINATION: Status: ACTIVE | Noted: 2020-12-14

## 2020-12-15 NOTE — CHART NOTE - NSCHARTNOTEFT_GEN_A_CORE
Called and spoke /w patient. States she feels well. She is breathing fine, voiding w/o issues. She does report intermittent abdominal pain. She reports taking ibuprofen consistently and even for those episodes of abd pain. I advise to her take ibuprofen with food. However, in the interim, she should switch off and use tylenol instead.     She has a PCP appt coming up which I encouraged her to follow. I advised her to discuss w/ PCP re: MRI of liver as well as repeat CXR to ensure resolution of abnormal findings. She was in agreement w/ plan. All questions/concerns addressed. She will also cont to follow w/ urology for follow up procedure as scheduled in Jan. She will contact office to confirm.

## 2020-12-22 ENCOUNTER — OUTPATIENT (OUTPATIENT)
Dept: OUTPATIENT SERVICES | Facility: HOSPITAL | Age: 25
LOS: 1 days | End: 2020-12-22
Payer: MEDICAID

## 2020-12-22 VITALS
DIASTOLIC BLOOD PRESSURE: 80 MMHG | HEART RATE: 100 BPM | HEIGHT: 67 IN | OXYGEN SATURATION: 99 % | WEIGHT: 197.98 LBS | TEMPERATURE: 99 F | RESPIRATION RATE: 16 BRPM | SYSTOLIC BLOOD PRESSURE: 130 MMHG

## 2020-12-22 DIAGNOSIS — N20.1 CALCULUS OF URETER: ICD-10-CM

## 2020-12-22 DIAGNOSIS — Z98.890 OTHER SPECIFIED POSTPROCEDURAL STATES: Chronic | ICD-10-CM

## 2020-12-22 DIAGNOSIS — Z90.89 ACQUIRED ABSENCE OF OTHER ORGANS: Chronic | ICD-10-CM

## 2020-12-22 LAB
ALBUMIN SERPL ELPH-MCNC: 4.4 G/DL — SIGNIFICANT CHANGE UP (ref 3.3–5)
ALP SERPL-CCNC: 104 U/L — SIGNIFICANT CHANGE UP (ref 40–120)
ALT FLD-CCNC: 19 U/L — SIGNIFICANT CHANGE UP (ref 4–33)
ANION GAP SERPL CALC-SCNC: 12 MMOL/L — SIGNIFICANT CHANGE UP (ref 7–14)
APTT BLD: 36.5 SEC — HIGH (ref 27–36.3)
AST SERPL-CCNC: 14 U/L — SIGNIFICANT CHANGE UP (ref 4–32)
BILIRUB SERPL-MCNC: 0.6 MG/DL — SIGNIFICANT CHANGE UP (ref 0.2–1.2)
BUN SERPL-MCNC: 13 MG/DL — SIGNIFICANT CHANGE UP (ref 7–23)
CALCIUM SERPL-MCNC: 10.2 MG/DL — SIGNIFICANT CHANGE UP (ref 8.4–10.5)
CHLORIDE SERPL-SCNC: 100 MMOL/L — SIGNIFICANT CHANGE UP (ref 98–107)
CO2 SERPL-SCNC: 25 MMOL/L — SIGNIFICANT CHANGE UP (ref 22–31)
CREAT SERPL-MCNC: 0.81 MG/DL — SIGNIFICANT CHANGE UP (ref 0.5–1.3)
GLUCOSE SERPL-MCNC: 99 MG/DL — SIGNIFICANT CHANGE UP (ref 70–99)
HCG SERPL-ACNC: <5 MIU/ML — SIGNIFICANT CHANGE UP
HCT VFR BLD CALC: 40.3 % — SIGNIFICANT CHANGE UP (ref 34.5–45)
HGB BLD-MCNC: 12.8 G/DL — SIGNIFICANT CHANGE UP (ref 11.5–15.5)
INR BLD: 1.21 RATIO — HIGH (ref 0.88–1.17)
MCHC RBC-ENTMCNC: 27.4 PG — SIGNIFICANT CHANGE UP (ref 27–34)
MCHC RBC-ENTMCNC: 31.8 GM/DL — LOW (ref 32–36)
MCV RBC AUTO: 86.1 FL — SIGNIFICANT CHANGE UP (ref 80–100)
NRBC # BLD: 0 /100 WBCS — SIGNIFICANT CHANGE UP
NRBC # FLD: 0 K/UL — SIGNIFICANT CHANGE UP
PLATELET # BLD AUTO: 601 K/UL — HIGH (ref 150–400)
POTASSIUM SERPL-MCNC: 3.9 MMOL/L — SIGNIFICANT CHANGE UP (ref 3.5–5.3)
POTASSIUM SERPL-SCNC: 3.9 MMOL/L — SIGNIFICANT CHANGE UP (ref 3.5–5.3)
PROT SERPL-MCNC: 8.1 G/DL — SIGNIFICANT CHANGE UP (ref 6–8.3)
PROTHROM AB SERPL-ACNC: 13.8 SEC — HIGH (ref 9.8–13.1)
RBC # BLD: 4.68 M/UL — SIGNIFICANT CHANGE UP (ref 3.8–5.2)
RBC # FLD: 13.2 % — SIGNIFICANT CHANGE UP (ref 10.3–14.5)
SODIUM SERPL-SCNC: 137 MMOL/L — SIGNIFICANT CHANGE UP (ref 135–145)
WBC # BLD: 8.78 K/UL — SIGNIFICANT CHANGE UP (ref 3.8–10.5)
WBC # FLD AUTO: 8.78 K/UL — SIGNIFICANT CHANGE UP (ref 3.8–10.5)

## 2020-12-22 PROCEDURE — 93010 ELECTROCARDIOGRAM REPORT: CPT

## 2020-12-22 NOTE — PROGRESS NOTE ADULT - PROVIDER SPECIALTY LIST ADULT
Urology Vital Signs Last 24 Hrs  T(C): 36.3 (17 Dec 2020 07:59), Max: 36.3 (17 Dec 2020 07:59)  T(F): 97.3 (17 Dec 2020 07:59), Max: 97.3 (17 Dec 2020 07:59)  HR: --  BP: --  BP(mean): --  RR: 18 (17 Dec 2020 07:59) (18 - 18)  SpO2: 100% (17 Dec 2020 07:59) (100% - 100%) Vital Signs Last 24 Hrs  T(C): 36.1 (14 Dec 2020 07:57), Max: 36.1 (14 Dec 2020 07:57)  T(F): 96.9 (14 Dec 2020 07:57), Max: 96.9 (14 Dec 2020 07:57)  HR: --  BP: --  BP(mean): --  RR: 14 (14 Dec 2020 07:57) (14 - 14)  SpO2: 100% (14 Dec 2020 07:57) (100% - 100%) Vital Signs Last 24 Hrs  T(C): 36.1 (16 Dec 2020 07:45), Max: 36.1 (16 Dec 2020 07:45)  T(F): 97 (16 Dec 2020 07:45), Max: 97 (16 Dec 2020 07:45)  HR: --  BP: --  BP(mean): --  RR: 18 (16 Dec 2020 07:45) (18 - 18)  SpO2: 100% (16 Dec 2020 07:45) (100% - 100%) Vital Signs Last 24 Hrs  T(C): 36.2 (13 Dec 2020 08:06), Max: 36.2 (13 Dec 2020 08:06)  T(F): 97.2 (13 Dec 2020 08:06), Max: 97.2 (13 Dec 2020 08:06)  HR: --  BP: --  BP(mean): --  RR: 18 (13 Dec 2020 08:06) (18 - 18)  SpO2: 99% (13 Dec 2020 08:06) (99% - 99%) Vital Signs Last 24 Hrs  T(C): 36.5 (15 Dec 2020 07:51), Max: 36.5 (15 Dec 2020 07:51)  T(F): 97.7 (15 Dec 2020 07:51), Max: 97.7 (15 Dec 2020 07:51)  HR: --  BP: --  BP(mean): --  RR: 14 (15 Dec 2020 07:51) (14 - 14)  SpO2: 100% (15 Dec 2020 07:51) (100% - 100%) Vital Signs Last 24 Hrs  T(C): 36.1 (18 Dec 2020 08:41), Max: 36.1 (18 Dec 2020 08:41)  T(F): 97 (18 Dec 2020 08:41), Max: 97 (18 Dec 2020 08:41)  HR: --  BP: --  BP(mean): --  RR: 14 (18 Dec 2020 08:41) (14 - 14)  SpO2: 100% (18 Dec 2020 08:41) (100% - 100%) Vital Signs Last 24 Hrs  T(C): 36.2 (21 Dec 2020 08:40), Max: 36.2 (21 Dec 2020 08:40)  T(F): 97.2 (21 Dec 2020 08:40), Max: 97.2 (21 Dec 2020 08:40)  HR: --  BP: --  BP(mean): --  RR: 18 (21 Dec 2020 08:40) (18 - 18)  SpO2: 100% (21 Dec 2020 08:40) (100% - 100%) Vital Signs Last 24 Hrs  T(C): 36.2 (22 Dec 2020 07:46), Max: 36.2 (22 Dec 2020 07:46)  T(F): 97.1 (22 Dec 2020 07:46), Max: 97.1 (22 Dec 2020 07:46)  HR: --  BP: --  BP(mean): --  RR: 18 (22 Dec 2020 07:46) (18 - 18)  SpO2: 100% (22 Dec 2020 07:46) (93% - 100%) Vital Signs Last 24 Hrs  T(C): 36.1 (18 Dec 2020 08:41), Max: 36.1 (18 Dec 2020 08:41)  T(F): 97 (18 Dec 2020 08:41), Max: 97 (18 Dec 2020 08:41)  HR: --  BP: --  BP(mean): --  RR: 14 (18 Dec 2020 08:41) (14 - 14)  SpO2: 100% (18 Dec 2020 08:41) (100% - 100%)

## 2020-12-22 NOTE — H&P PST ADULT - NSICDXPASTMEDICALHX_GEN_ALL_CORE_FT
PAST MEDICAL HISTORY:  History of UTI 12/2020    Kidney stone 12/2020    Liver finding Per pt recent c/t scan with new liver finding ; per pt MRI pending    Pneumonia 12/2020     PAST MEDICAL HISTORY:  History of PCOS     History of UTI 12/2020    Kidney stone 12/2020    Liver finding Per pt recent c/t scan with new liver finding ; per pt MRI pending    Pneumonia 12/2020

## 2020-12-22 NOTE — H&P PST ADULT - HISTORY OF PRESENT ILLNESS
Pt is a 25 y.o. female ;pt reports 12/06/2020 ; to ER @ [facility] with fever ; a w/u was done ; +UTI ; a c/t scan was done " there is a stone in the left kidney. Pt s/p Cystoscopy Left Ureteral Stent Placement. Pt also tx for pneumonia. . Pt discharged ; f/u with surgeon ; pt now presents for Cystoscopy Left Ureteroscopy Laser Lithotripsy Stone Extraction  Exchange of Left Ureteral Stent  Pt is a 25 y.o. female ;pt reports 12/06/2020 ; to ER @ [facility]  ; +UTI ; a c/t scan was done " there is a stone in the left kidney. Pt s/p Cystoscopy Left Ureteral Stent Placement. Pt also tx for pneumonia. . Pt discharged ; f/u with surgeon ; pt now presents for Cystoscopy Left Ureteroscopy Laser Lithotripsy Stone Extraction  Exchange of Left Ureteral Stent  Pt is a 25 y.o. female ;pt reports 12/06/2020 ; to ER @ [facility]  ; +UTI ; a c/t scan was done " there is a stone in the left kidney. Pt s/p Cystoscopy Left Ureteral Stent Placement. Pt also tx for pneumonia. . Pt discharged 12/12/2020 ; f/u with surgeon ; pt now presents for Cystoscopy Left Ureteroscopy Laser Lithotripsy Stone Extraction  Exchange of Left Ureteral Stent

## 2020-12-22 NOTE — H&P PST ADULT - NSICDXPROBLEM_GEN_ALL_CORE_FT
PROBLEM DIAGNOSES  Problem: Calculus of ureter  Assessment and Plan:        PROBLEM DIAGNOSES  Problem: Calculus of ureter  Assessment and Plan: Cystoscopy Left Ureteroscopy , Laser Lithotripsy , Stone Extraction. Exchange of Left Ureteral stent   Pre op instructions reviewed with pt ; pt verbalized good understanding of pre op instructions   Cup provided for UCG dos   Pt to Dr Teresa for pre op evaluation  Urine C&S done 12/22/2020 ; per pt antibiotics x 1-2 to be completed. Call to surgeons office ; to confirm need for C&S in PST ; S/W Rashmi ; C&S to be sent by PST . Pt to make surgeon aware antibiotics x 1-2 remained to be completed when Urine  C&S done . Pt aware

## 2020-12-22 NOTE — H&P PST ADULT - NS MD HP PULSE POSTERIOR
Bedside shift change report given to Jeffry (oncoming nurse) by Livia Moran (offgoing nurse). Report included the following information SBAR, Intake/Output, MAR and Cardiac Rhythm A flutter. right normal/left normal

## 2020-12-22 NOTE — H&P PST ADULT - NSICDXPASTSURGICALHX_GEN_ALL_CORE_FT
PAST SURGICAL HISTORY:  S/P cystoscopy Left Ureteral stent placement 12/2020    S/P tonsillectomy

## 2020-12-22 NOTE — H&P PST ADULT - NSICDXFAMILYHX_GEN_ALL_CORE_FT
FAMILY HISTORY:  FH: diabetes mellitus    Aunt  Still living? Yes, Estimated age: 41-50  Family history of tonsillitis, Age at diagnosis: Age Unknown

## 2020-12-22 NOTE — H&P PST ADULT - SYMPTOMS
pt reports cp 12/2020 while inpt @ [facility]. Pt s/p EKG/ Echo ; Pt denies further c/o cp, palpitations, sob/none pt reports cp x1 12/06-12/12 while inpt @ [facility]. Pt s/p EKG/ Echo ; Pt denies further c/o cp, palpitations, sob/none

## 2020-12-23 PROBLEM — N20.0 CALCULUS OF KIDNEY: Chronic | Status: ACTIVE | Noted: 2020-12-22

## 2020-12-23 PROBLEM — Z87.440 PERSONAL HISTORY OF URINARY (TRACT) INFECTIONS: Chronic | Status: ACTIVE | Noted: 2020-12-22

## 2020-12-23 PROBLEM — Z87.42 PERSONAL HISTORY OF OTHER DISEASES OF THE FEMALE GENITAL TRACT: Chronic | Status: ACTIVE | Noted: 2020-12-22

## 2020-12-23 PROBLEM — R19.8 OTHER SPECIFIED SYMPTOMS AND SIGNS INVOLVING THE DIGESTIVE SYSTEM AND ABDOMEN: Chronic | Status: ACTIVE | Noted: 2020-12-22

## 2020-12-23 PROBLEM — J18.9 PNEUMONIA, UNSPECIFIED ORGANISM: Chronic | Status: ACTIVE | Noted: 2020-12-22

## 2020-12-23 LAB
CULTURE RESULTS: NO GROWTH — SIGNIFICANT CHANGE UP
SPECIMEN SOURCE: SIGNIFICANT CHANGE UP

## 2020-12-29 ENCOUNTER — APPOINTMENT (OUTPATIENT)
Dept: UROLOGY | Facility: CLINIC | Age: 25
End: 2020-12-29
Payer: MEDICAID

## 2020-12-29 VITALS
HEART RATE: 75 BPM | BODY MASS INDEX: 30.01 KG/M2 | SYSTOLIC BLOOD PRESSURE: 107 MMHG | RESPIRATION RATE: 17 BRPM | WEIGHT: 198 LBS | DIASTOLIC BLOOD PRESSURE: 70 MMHG | HEIGHT: 68 IN | TEMPERATURE: 97.6 F

## 2020-12-29 DIAGNOSIS — Z87.09 PERSONAL HISTORY OF OTHER DISEASES OF THE RESPIRATORY SYSTEM: ICD-10-CM

## 2020-12-29 PROCEDURE — 99213 OFFICE O/P EST LOW 20 MIN: CPT

## 2020-12-29 PROCEDURE — 99072 ADDL SUPL MATRL&STAF TM PHE: CPT

## 2020-12-29 RX ORDER — PANTOPRAZOLE SODIUM 40 MG/10ML
40 INJECTION, POWDER, FOR SOLUTION INTRAVENOUS
Refills: 0 | Status: ACTIVE | COMMUNITY

## 2020-12-29 RX ORDER — TAMSULOSIN HYDROCHLORIDE 0.4 MG/1
0.4 CAPSULE ORAL
Refills: 0 | Status: ACTIVE | COMMUNITY

## 2020-12-29 RX ORDER — LEVOFLOXACIN 750 MG/1
750 TABLET, FILM COATED ORAL
Refills: 0 | Status: ACTIVE | COMMUNITY

## 2020-12-29 NOTE — REVIEW OF SYSTEMS
[see HPI] : see HPI [Date of last menstrual period ____] : date of last menstrual period: [unfilled] [Abnormal menstrual period, if yes explain ___] : abnormal menstrual period [unfilled] [Negative] : Heme/Lymph

## 2020-12-30 LAB — BACTERIA UR CULT: NORMAL

## 2020-12-31 DIAGNOSIS — Z01.818 ENCOUNTER FOR OTHER PREPROCEDURAL EXAMINATION: ICD-10-CM

## 2020-12-31 NOTE — ASU PATIENT PROFILE, ADULT - PMH
History of PCOS    History of UTI  12/2020  Kidney stone  12/2020  Liver finding  Per pt recent c/t scan with new liver finding ; per pt MRI pending  Pneumonia  12/2020

## 2021-01-01 ENCOUNTER — APPOINTMENT (OUTPATIENT)
Dept: DISASTER EMERGENCY | Facility: CLINIC | Age: 26
End: 2021-01-01

## 2021-01-02 LAB — SARS-COV-2 N GENE NPH QL NAA+PROBE: NOT DETECTED

## 2021-01-03 ENCOUNTER — TRANSCRIPTION ENCOUNTER (OUTPATIENT)
Age: 26
End: 2021-01-03

## 2021-01-04 ENCOUNTER — OUTPATIENT (OUTPATIENT)
Dept: OUTPATIENT SERVICES | Facility: HOSPITAL | Age: 26
LOS: 1 days | Discharge: HOME | End: 2021-01-04
Payer: MEDICAID

## 2021-01-04 ENCOUNTER — APPOINTMENT (OUTPATIENT)
Dept: UROLOGY | Facility: HOSPITAL | Age: 26
End: 2021-01-04

## 2021-01-04 VITALS
HEIGHT: 67 IN | SYSTOLIC BLOOD PRESSURE: 130 MMHG | HEART RATE: 100 BPM | TEMPERATURE: 99 F | OXYGEN SATURATION: 99 % | DIASTOLIC BLOOD PRESSURE: 80 MMHG | WEIGHT: 197.98 LBS | RESPIRATION RATE: 16 BRPM

## 2021-01-04 VITALS
RESPIRATION RATE: 18 BRPM | HEART RATE: 78 BPM | OXYGEN SATURATION: 97 % | DIASTOLIC BLOOD PRESSURE: 67 MMHG | SYSTOLIC BLOOD PRESSURE: 113 MMHG

## 2021-01-04 DIAGNOSIS — Z98.890 OTHER SPECIFIED POSTPROCEDURAL STATES: Chronic | ICD-10-CM

## 2021-01-04 DIAGNOSIS — Z90.89 ACQUIRED ABSENCE OF OTHER ORGANS: Chronic | ICD-10-CM

## 2021-01-04 DIAGNOSIS — N20.1 CALCULUS OF URETER: ICD-10-CM

## 2021-01-04 LAB — HCG UR QL: NEGATIVE — SIGNIFICANT CHANGE UP

## 2021-01-04 PROCEDURE — 52352 CYSTOURETERO W/STONE REMOVE: CPT | Mod: LT

## 2021-01-04 RX ORDER — CIPROFLOXACIN LACTATE 400MG/40ML
1 VIAL (ML) INTRAVENOUS
Qty: 0 | Refills: 0 | DISCHARGE

## 2021-01-04 RX ORDER — TAMSULOSIN HYDROCHLORIDE 0.4 MG/1
1 CAPSULE ORAL
Qty: 0 | Refills: 0 | DISCHARGE

## 2021-01-04 RX ORDER — PANTOPRAZOLE SODIUM 20 MG/1
1 TABLET, DELAYED RELEASE ORAL
Qty: 0 | Refills: 0 | DISCHARGE

## 2021-01-04 RX ORDER — ACETAMINOPHEN 500 MG
1 TABLET ORAL
Qty: 0 | Refills: 0 | DISCHARGE

## 2021-01-04 RX ORDER — SODIUM CHLORIDE 9 MG/ML
1000 INJECTION, SOLUTION INTRAVENOUS
Refills: 0 | Status: DISCONTINUED | OUTPATIENT
Start: 2021-01-04 | End: 2021-01-04

## 2021-01-04 RX ORDER — SODIUM CHLORIDE 9 MG/ML
1000 INJECTION, SOLUTION INTRAVENOUS
Refills: 0 | Status: DISCONTINUED | OUTPATIENT
Start: 2021-01-04 | End: 2021-01-18

## 2021-01-04 RX ORDER — AZTREONAM 2 G
1 VIAL (EA) INJECTION
Qty: 6 | Refills: 0
Start: 2021-01-04 | End: 2021-01-06

## 2021-01-04 NOTE — ASU DISCHARGE PLAN (ADULT/PEDIATRIC) - ASU DC SPECIAL INSTRUCTIONSFT
BATHING: Please do not submerge wound underwater. You may shower and/or sponge bathe.  ACTIVITY: No heavy lifting or straining for the next 24 hours. Otherwise, you may return to your usual level of physical activity.   DIET: Return to your usual diet.  NOTIFY YOUR SURGEON IF: You have , any fever (over 100.4 F) or chills, persistent nausea/vomiting, persistent diarrhea, or if your pain is not controlled on your discharge pain medications. You may have some blood in your urine with the stent in place. Please notify the office if the you have large amount of bleeding with clots or become unable to empty your bladder.   FOLLOW-UP:  1. Please call to make a follow-up appointment with Dr Diggs to have your stent removed in the office. BATHING: Please do not submerge wound underwater. You may shower and/or sponge bathe.  ACTIVITY: No heavy lifting or straining for the next 24 hours. Otherwise, you may return to your usual level of physical activity.   DIET: Return to your usual diet.  NOTIFY YOUR SURGEON IF: You have , any fever (over 100.4 F) or chills, persistent nausea/vomiting, persistent diarrhea, or if your pain is not controlled on your discharge pain medications. You may have some blood in your urine with the stent in place. Please notify the office if the you have large amount of bleeding with clots or become unable to empty your bladder.   FOLLOW-UP:  1. Please call to make a follow-up appointment with Dr Diggs for this Friday, 1/8, to have your stent removed in the office.

## 2021-01-04 NOTE — ASU DISCHARGE PLAN (ADULT/PEDIATRIC) - CALL YOUR DOCTOR IF YOU HAVE ANY OF THE FOLLOWING:
Bleeding that does not stop/Pain not relieved by Medications/Fever greater than (need to indicate Fahrenheit or Celsius)/Nausea and vomiting that does not stop/Unable to urinate/Inability to tolerate liquids or foods/Increased irritability or sluggishness

## 2021-01-07 LAB — NIDUS STONE QN: SIGNIFICANT CHANGE UP

## 2021-01-08 ENCOUNTER — APPOINTMENT (OUTPATIENT)
Dept: UROLOGY | Facility: CLINIC | Age: 26
End: 2021-01-08
Payer: MEDICAID

## 2021-01-08 ENCOUNTER — OUTPATIENT (OUTPATIENT)
Dept: OUTPATIENT SERVICES | Facility: HOSPITAL | Age: 26
LOS: 1 days | End: 2021-01-08
Payer: MEDICAID

## 2021-01-08 VITALS — DIASTOLIC BLOOD PRESSURE: 73 MMHG | HEART RATE: 103 BPM | SYSTOLIC BLOOD PRESSURE: 104 MMHG | TEMPERATURE: 98 F

## 2021-01-08 DIAGNOSIS — N20.1 CALCULUS OF URETER: ICD-10-CM

## 2021-01-08 DIAGNOSIS — Z90.89 ACQUIRED ABSENCE OF OTHER ORGANS: Chronic | ICD-10-CM

## 2021-01-08 DIAGNOSIS — Z98.890 OTHER SPECIFIED POSTPROCEDURAL STATES: Chronic | ICD-10-CM

## 2021-01-08 DIAGNOSIS — R35.0 FREQUENCY OF MICTURITION: ICD-10-CM

## 2021-01-08 PROCEDURE — 52310 CYSTOSCOPY AND TREATMENT: CPT | Mod: 58

## 2021-01-08 PROCEDURE — 52310 CYSTOSCOPY AND TREATMENT: CPT

## 2021-01-13 DIAGNOSIS — N20.1 CALCULUS OF URETER: ICD-10-CM

## 2021-01-29 ENCOUNTER — TRANSCRIPTION ENCOUNTER (OUTPATIENT)
Age: 26
End: 2021-01-29

## 2021-06-11 NOTE — ASU PREOP CHECKLIST - SIDE RAILS UP
Airway    Date/Time: 6/10/2021 8:40 PM  Performed by: Marcellus Payne M.D.  Authorized by: Marcellus Payne M.D.     Location:  OR  Urgency:  Elective  Difficult Airway: No    Indications for Airway Management:  Anesthesia      Spontaneous Ventilation: absent    Sedation Level:  Deep  Preoxygenated: Yes    Patient Position:  Sniffing  Mask Difficulty Assessment:  2 - vent by mask + OA or adjuvant +/- NMBA  Final Airway Type:  Endotracheal airway  Final Endotracheal Airway:  ETT  Cuffed: Yes    Technique Used for Successful ETT Placement:  Direct laryngoscopy  Devices/Methods Used in Placement:  Cricoid pressure    Insertion Site:  Oral  Blade Type:  Seth  Laryngoscope Blade/Videolaryngoscope Blade Size:  3  ETT Size (mm):  8.0  Measured from:  Teeth  ETT to Teeth (cm):  23  Placement Verified by: auscultation and capnometry    Cormack-Lehane Classification:  Grade IIa - partial view of glottis  Number of Attempts at Approach:  1   Atraumatic DLx1 modified RSI with cricoid pressure          
n/a

## 2022-09-07 NOTE — ED PROVIDER NOTE - OBSERVING MD:
GENERAL SURGERY PROGRESS NOTE    Hospital Day: 4  Post operative day: 1  Procedure: ?Diagnostic laparoscopy with lysis of adhesions    24 Hour Events:  Patient went to OR overnight for diagnostic laparoscopy with lysis of adhesions.  Procedure tolerated well.    Patient's pain is controlled.  N/V(-)  BM-/F-    Vitals:  T(F): 98.2 (09-06-22 @ 17:30), Max: 98.5 (09-06-22 @ 07:00)  HR: 94 (09-07-22 @ 00:00)  BP: 152/78 (09-07-22 @ 00:00)  RR: 20 (09-06-22 @ 17:48)  SpO2: 93% (09-07-22 @ 00:00)    Diet, NPO    Fluids: lactated ringers.: Solution, 1000 milliLiter(s) infuse at 125 mL/Hr  Provider's Contact #: [phone]    I & O's:    09-05-22 @ 07:01  -  09-06-22 @ 07:00  --------------------------------------------------------  IN:    IV PiggyBack: 100 mL    Lactated Ringers: 3000 mL  Total IN: 3100 mL    OUT:    Indwelling Catheter - Urethral (mL): 1085 mL    Nasogastric/Oral tube (mL): 425 mL  Total OUT: 1510 mL    Total NET: 1590 mL    PHYSICAL EXAM:  General: NAD  Cardiac: RRR, S1/S2 identified, nmrg  Respiratory: unlabored breathing at rest, clear to auscultation bilaterally  Abdomen: Soft, non-distended, moderately tender around incision sites, no rebound, no guarding.  Musculoskeletal: FROM in b/l UE and LE  Neuro: Sensation grossly intact and equal throughout, no focal deficits  Skin: Warm/dry, normal color, no jaundice  Incision/wound: Clean, dry and intact    MEDICATIONS  (STANDING):  cefoTEtan  IVPB 1 Gram(s) IV Intermittent every 12 hours  chlorhexidine 2% Cloths 1 Application(s) Topical <User Schedule>  heparin   Injectable 5000 Unit(s) SubCutaneous every 8 hours  lactated ringers. 1000 milliLiter(s) (125 mL/Hr) IV Continuous <Continuous>  metoprolol tartrate Injectable 5 milliGRAM(s) IV Push every 6 hours  pantoprazole  Injectable 40 milliGRAM(s) IV Push daily    MEDICATIONS  (PRN):  ondansetron Injectable 4 milliGRAM(s) IV Push every 8 hours PRN Nausea and/or Vomiting    DVT PROPHYLAXIS: heparin   Injectable 5000 Unit(s) SubCutaneous every 8 hours    GI PROPHYLAXIS: pantoprazole  Injectable 40 milliGRAM(s) IV Push daily    ANTICOAGULATION:   ANTIBIOTICS:  cefoTEtan  IVPB 1 Gram(s)    LAB/STUDIES:  Labs:  CAPILLARY BLOOD GLUCOSE    POCT Blood Glucose.: 90 mg/dL (07 Sep 2022 00:27)  POCT Blood Glucose.: 90 mg/dL (06 Sep 2022 17:52)  POCT Blood Glucose.: 108 mg/dL (06 Sep 2022 09:25)  POCT Blood Glucose.: 113 mg/dL (06 Sep 2022 06:06)  POCT Blood Glucose.: 102 mg/dL (06 Sep 2022 02:48)               13.8   9.48  )-----------( 106      ( 06 Sep 2022 22:19 )             41.1         09-06    145  |  107  |  42<H>  ----------------------------<  111<H>  4.7   |  29  |  2.1<H>      Calcium, Total Serum: 8.9 mg/dL (09-06-22 @ 22:19)    LFTs:     Lactate, Blood: 1.6 mmol/L (09-06-22 @ 05:56)  Lactate, Blood: 1.8 mmol/L (09-05-22 @ 11:48)  Lactate, Blood: 2.5 mmol/L (09-05-22 @ 05:59)  Lactate, Blood: 3.5 mmol/L (09-04-22 @ 23:13)  Lactate, Blood: 3.7 mmol/L (09-04-22 @ 12:26)      Coags:    CARDIAC MARKERS ( 05 Sep 2022 11:48 )  x     / <0.01 ng/mL / 93 U/L / x     / 5.9 ng/mL  CARDIAC MARKERS ( 05 Sep 2022 05:59 )  x     / <0.01 ng/mL / 66 U/L / x     / 5.9 ng/mL     dr. pereira

## 2022-12-20 ENCOUNTER — NON-APPOINTMENT (OUTPATIENT)
Age: 27
End: 2022-12-20

## 2023-02-06 ENCOUNTER — EMERGENCY (EMERGENCY)
Facility: HOSPITAL | Age: 28
LOS: 1 days | Discharge: HOME | End: 2023-02-06
Attending: EMERGENCY MEDICINE | Admitting: EMERGENCY MEDICINE
Payer: COMMERCIAL

## 2023-02-06 VITALS
HEART RATE: 98 BPM | SYSTOLIC BLOOD PRESSURE: 133 MMHG | OXYGEN SATURATION: 100 % | RESPIRATION RATE: 19 BRPM | TEMPERATURE: 98 F | DIASTOLIC BLOOD PRESSURE: 67 MMHG

## 2023-02-06 DIAGNOSIS — Z90.89 ACQUIRED ABSENCE OF OTHER ORGANS: Chronic | ICD-10-CM

## 2023-02-06 DIAGNOSIS — Z98.890 OTHER SPECIFIED POSTPROCEDURAL STATES: Chronic | ICD-10-CM

## 2023-02-06 LAB
APPEARANCE UR: ABNORMAL
BACTERIA # UR AUTO: ABNORMAL
BILIRUB UR-MCNC: NEGATIVE — SIGNIFICANT CHANGE UP
COLOR SPEC: YELLOW — SIGNIFICANT CHANGE UP
COMMENT - URINE: SIGNIFICANT CHANGE UP
DIFF PNL FLD: ABNORMAL
FLUAV AG NPH QL: SIGNIFICANT CHANGE UP
FLUBV AG NPH QL: SIGNIFICANT CHANGE UP
GLUCOSE UR QL: NEGATIVE — SIGNIFICANT CHANGE UP
KETONES UR-MCNC: ABNORMAL
LEUKOCYTE ESTERASE UR-ACNC: ABNORMAL
NITRITE UR-MCNC: NEGATIVE — SIGNIFICANT CHANGE UP
PH UR: 6 — SIGNIFICANT CHANGE UP (ref 5–8)
PROT UR-MCNC: 70 — SIGNIFICANT CHANGE UP
RBC CASTS # UR COMP ASSIST: 5 /HPF — HIGH (ref 0–4)
RSV RNA NPH QL NAA+NON-PROBE: SIGNIFICANT CHANGE UP
SARS-COV-2 RNA SPEC QL NAA+PROBE: SIGNIFICANT CHANGE UP
SP GR SPEC: 1.03 — SIGNIFICANT CHANGE UP (ref 1.01–1.05)
UROBILINOGEN FLD QL: SIGNIFICANT CHANGE UP
WBC UR QL: SIGNIFICANT CHANGE UP /HPF (ref 0–5)

## 2023-02-06 PROCEDURE — 99284 EMERGENCY DEPT VISIT MOD MDM: CPT

## 2023-02-06 RX ORDER — ACETAMINOPHEN 500 MG
650 TABLET ORAL ONCE
Refills: 0 | Status: COMPLETED | OUTPATIENT
Start: 2023-02-06 | End: 2023-02-06

## 2023-02-06 RX ORDER — ONDANSETRON 8 MG/1
4 TABLET, FILM COATED ORAL ONCE
Refills: 0 | Status: COMPLETED | OUTPATIENT
Start: 2023-02-06 | End: 2023-02-06

## 2023-02-06 RX ORDER — ONDANSETRON 8 MG/1
1 TABLET, FILM COATED ORAL
Qty: 15 | Refills: 0
Start: 2023-02-06 | End: 2023-02-10

## 2023-02-06 RX ADMIN — Medication 650 MILLIGRAM(S): at 08:21

## 2023-02-06 RX ADMIN — Medication 650 MILLIGRAM(S): at 08:51

## 2023-02-06 RX ADMIN — ONDANSETRON 4 MILLIGRAM(S): 8 TABLET, FILM COATED ORAL at 08:21

## 2023-02-06 NOTE — ED PROVIDER NOTE - CLINICAL SUMMARY MEDICAL DECISION MAKING FREE TEXT BOX
28 y/o F with PMHx of kidney stones presents to the ED for 1 day of diarrhea and nausea. Pt endorses diffuse abdominal pain. Pt appears well, nontoxic appearing. No TTP of abdomen. Moist mucous membranes. Differentials include but not limited to pregnancy, gastritis, food poisoning, other viral illness, kidney stones. Kidney stones less likely as pain is diffuse abdominal pain, not in flank or unilateral pain. Will do pregnancy test. Pt able to tolerate fluids so less concern for dehydration. Will give zofran and tylenol and reassess. Dispo home.

## 2023-02-06 NOTE — ED PROVIDER NOTE - ATTENDING CONTRIBUTION TO CARE
27 year old with 1 day of nausea and diarrhea. belly soft. suspect gastro vs colitis vs diverticulitis vs uti vs viral syndrome. belly exam not consistent with appy, gb disease or sbo, will not obtain ct. ua viral swab, reassess

## 2023-02-06 NOTE — ED PROVIDER NOTE - PHYSICAL EXAMINATION
Constitutional: VS reviewed. Alert and orientedx3, well appearing, no apparent distress  HEENT: Atraumatic, EOMI, moist mucous membranes   CV: RRR   Lungs: Clear and equal bilaterally, no wheezes, rales or crackles  Abdomen: Soft, nondistended, nontender  MSK: No deformities  Skin: Warm and dry. As visualized no rashes, lesions, bruising or erythema  Neuro: Strength 5/5 in all extremities, sensation intact.   Lymph: No pitting edema in extremities.

## 2023-02-06 NOTE — ED PROVIDER NOTE - OBJECTIVE STATEMENT
28 y/o F with PMHx of kidney stones presents to the ED for 1 day of diarrhea and nausea. Patient states around 2 AM yesterday she started having episodes of diarrhea which have persisted until now.  Patient states diarrhea is watery nonbloody.  Patient endorses nausea but no vomiting.  Patient has been able to tolerate p.o. intake including both solids and fluids.  Patient endorses tactile fevers yesterday.  Patient also endorses diffuse abdominal cramping that comes and goes.  Patient denies taking anything for fevers diarrhea or pain.  Patient denies headaches, congestion, cough, chest pain, shortness of breath, vomiting, dysuria, hematuria.

## 2023-02-06 NOTE — ED PROVIDER NOTE - PROGRESS NOTE DETAILS
Sheila Muro PGY1: Pt reassessed and resting comfortably. Pt able to tolerate PO. Pt okay for dc at this time.

## 2023-02-06 NOTE — ED PROVIDER NOTE - NSFOLLOWUPINSTRUCTIONS_ED_ALL_ED_FT
Diarrhea, Adult      Diarrhea is when you pass loose and watery poop (stool) often. Diarrhea can make you feel weak and cause you to lose water in your body (get dehydrated). Losing water in your body can cause you to:  •Feel tired and thirsty.      •Have a dry mouth.      •Go pee (urinate) less often.      Diarrhea often lasts 2–3 days. However, it can last longer if it is a sign of something more serious. It is important to treat your diarrhea as told by your doctor.      Follow these instructions at home:      Eating and drinking       A bottle of clear juice and a glass of water.       Bread, rice, and cereal from the grain group.      Follow these instructions as told by your doctor:  •Take an ORS (oral rehydration solution). This is a drink that helps you replace fluids and minerals your body lost. It is sold at pharmacies and stores.    •Drink plenty of fluids, such as:  •Water.      •Ice chips.      •Diluted fruit juice.      •Low-calorie sports drinks.      •Milk, if you want.        •Avoid drinking fluids that have a lot of sugar or caffeine in them.    •Eat bland, easy-to-digest foods in small amounts as you are able. These foods include:  •Bananas.      •Applesauce.      •Rice.      •Low-fat (lean) meats.      •Toast.      •Crackers.        •Avoid alcohol.      •Avoid spicy or fatty foods.      Medicines     •Take over-the-counter and prescription medicines only as told by your doctor.      •If you were prescribed an antibiotic medicine, take it as told by your doctor. Do not stop using the antibiotic even if you start to feel better.        General instructions   Washing hands with soap and water. •Wash your hands often using soap and water. If soap and water are not available, use a hand . Others in your home should wash their hands as well. Hands should be washed:  •After using the toilet or changing a diaper.      •Before preparing, cooking, or serving food.      •While caring for a sick person.      •While visiting someone in a hospital.        •Drink enough fluid to keep your pee (urine) pale yellow.      •Rest at home while you get better.      •Take a warm bath to help with any burning or pain from having diarrhea.      •Watch your condition for any changes.      •Keep all follow-up visits as told by your doctor. This is important.        Contact a doctor if:    •You have a fever.      •Your diarrhea gets worse.      •You have new symptoms.      •You cannot keep fluids down.      •You feel light-headed or dizzy.      •You have a headache.      •You have muscle cramps.        Get help right away if:    •You have chest pain.      •You feel very weak or you pass out (faint).      •You have bloody or black poop or poop that looks like tar.      •You have very bad pain, cramping, or bloating in your belly (abdomen).      •You have trouble breathing or you are breathing very quickly.      •Your heart is beating very quickly.      •Your skin feels cold and clammy.      •You feel confused.    •You have signs of losing too much water in your body, such as:  •Dark pee, very little pee, or no pee.      •Cracked lips.      •Dry mouth.      •Sunken eyes.      •Sleepiness.      •Weakness.          Summary    •Diarrhea is when you pass loose and watery poop (stool) often.      •Diarrhea can make you feel weak and cause you to lose water in your body (get dehydrated).      •Take an ORS (oral rehydration solution). This is a drink that is sold at pharmacies and stores.      •Eat bland, easy-to-digest foods in small amounts as you are able.      •Contact a doctor if your condition gets worse. Get help right away if you have signs that you have lost too much water in your body.      This information is not intended to replace advice given to you by your health care provider. Make sure you discuss any questions you have with your health care provider.

## 2023-02-06 NOTE — ED PROVIDER NOTE - PATIENT PORTAL LINK FT
You can access the FollowMyHealth Patient Portal offered by [facility] by registering at the following website: [email or web address]. By joining [employer]’s FollowMyHealth portal, you will also be able to view your health information using other applications (apps) compatible with our system.

## 2023-02-06 NOTE — ED PROVIDER NOTE - NS ED ATTENDING STATEMENT MOD
This was a shared visit with the ADRIAN. I reviewed and verified the documentation and independently performed the documented: Attending with

## 2023-02-06 NOTE — ED PROVIDER NOTE - ATTENDING APP SHARED VISIT CONTRIBUTION OF CARE
27 year old with 1 day of nausea and diarrhea. belly soft. suspect gastro vs colitis vs diverticulitis vs uti vs viral syndrome. belly exam not consistent with appy, gb disease or sbo, will not obtain ct. ua viral swab, reassess
Universal Safety Interventions

## 2023-02-06 NOTE — ED ADULT TRIAGE NOTE - CHIEF COMPLAINT QUOTE
Pt c/o intermittent abd pain, diarrhea and nausea vomiting x2 days. Denies chest pain, sob. PMHX kidney stones. Well appearing.

## 2023-02-07 LAB
CULTURE RESULTS: SIGNIFICANT CHANGE UP
SPECIMEN SOURCE: SIGNIFICANT CHANGE UP

## 2023-08-02 NOTE — ED ADULT NURSE NOTE - CAS TRG GEN SKIN COLOR
Normal for race
Bed/Stretcher in lowest position, wheels locked, appropriate side rails in place/Call bell, personal items and telephone in reach/Instruct patient to call for assistance before getting out of bed/chair/stretcher/Non-slip footwear applied when patient is off stretcher/[place] to call system/Physically safe environment - no spills, clutter or unnecessary equipment/Purposeful proactive rounding/Room/bathroom lighting operational, light cord in reach

## 2024-09-06 ENCOUNTER — OUTPATIENT (OUTPATIENT)
Dept: OUTPATIENT SERVICES | Facility: HOSPITAL | Age: 29
LOS: 1 days | End: 2024-09-06
Payer: COMMERCIAL

## 2024-09-06 VITALS
WEIGHT: 210.1 LBS | DIASTOLIC BLOOD PRESSURE: 72 MMHG | HEART RATE: 77 BPM | RESPIRATION RATE: 16 BRPM | TEMPERATURE: 98 F | HEIGHT: 68 IN | OXYGEN SATURATION: 96 % | SYSTOLIC BLOOD PRESSURE: 114 MMHG

## 2024-09-06 DIAGNOSIS — Z98.890 OTHER SPECIFIED POSTPROCEDURAL STATES: Chronic | ICD-10-CM

## 2024-09-06 DIAGNOSIS — Z01.818 ENCOUNTER FOR OTHER PREPROCEDURAL EXAMINATION: ICD-10-CM

## 2024-09-06 DIAGNOSIS — Z90.89 ACQUIRED ABSENCE OF OTHER ORGANS: Chronic | ICD-10-CM

## 2024-09-06 DIAGNOSIS — N93.9 ABNORMAL UTERINE AND VAGINAL BLEEDING, UNSPECIFIED: ICD-10-CM

## 2024-09-06 PROBLEM — Z87.440 PERSONAL HISTORY OF URINARY (TRACT) INFECTIONS: Chronic | Status: INACTIVE | Noted: 2020-12-22 | Resolved: 2024-09-06

## 2024-09-06 PROBLEM — J18.9 PNEUMONIA, UNSPECIFIED ORGANISM: Chronic | Status: INACTIVE | Noted: 2020-12-22 | Resolved: 2024-09-06

## 2024-09-06 PROBLEM — R19.8 OTHER SPECIFIED SYMPTOMS AND SIGNS INVOLVING THE DIGESTIVE SYSTEM AND ABDOMEN: Chronic | Status: INACTIVE | Noted: 2020-12-22 | Resolved: 2024-09-06

## 2024-09-06 LAB
HCG SERPL-ACNC: <1 MIU/ML — SIGNIFICANT CHANGE UP
HCT VFR BLD CALC: 42.4 % — SIGNIFICANT CHANGE UP (ref 34.5–45)
HGB BLD-MCNC: 14 G/DL — SIGNIFICANT CHANGE UP (ref 11.5–15.5)
MCHC RBC-ENTMCNC: 28.6 PG — SIGNIFICANT CHANGE UP (ref 27–34)
MCHC RBC-ENTMCNC: 33 GM/DL — SIGNIFICANT CHANGE UP (ref 32–36)
MCV RBC AUTO: 86.7 FL — SIGNIFICANT CHANGE UP (ref 80–100)
NRBC # BLD: 0 /100 WBCS — SIGNIFICANT CHANGE UP (ref 0–0)
PLATELET # BLD AUTO: 377 K/UL — SIGNIFICANT CHANGE UP (ref 150–400)
RBC # BLD: 4.89 M/UL — SIGNIFICANT CHANGE UP (ref 3.8–5.2)
RBC # FLD: 12.8 % — SIGNIFICANT CHANGE UP (ref 10.3–14.5)
WBC # BLD: 9.13 K/UL — SIGNIFICANT CHANGE UP (ref 3.8–10.5)
WBC # FLD AUTO: 9.13 K/UL — SIGNIFICANT CHANGE UP (ref 3.8–10.5)

## 2024-09-06 PROCEDURE — 86850 RBC ANTIBODY SCREEN: CPT

## 2024-09-06 PROCEDURE — G0463: CPT

## 2024-09-06 PROCEDURE — 86900 BLOOD TYPING SEROLOGIC ABO: CPT

## 2024-09-06 PROCEDURE — 36415 COLL VENOUS BLD VENIPUNCTURE: CPT

## 2024-09-06 PROCEDURE — 85027 COMPLETE CBC AUTOMATED: CPT

## 2024-09-06 PROCEDURE — 84702 CHORIONIC GONADOTROPIN TEST: CPT

## 2024-09-06 PROCEDURE — 86901 BLOOD TYPING SEROLOGIC RH(D): CPT

## 2024-09-06 NOTE — H&P PST ADULT - NSICDXPASTSURGICALHX_GEN_ALL_CORE_FT
PAST SURGICAL HISTORY:  H/O lithotripsy     S/P cystoscopy Left Ureteral stent placement 12/2020    S/P tonsillectomy

## 2024-09-06 NOTE — H&P PST ADULT - NSICDXPASTMEDICALHX_GEN_ALL_CORE_FT
PAST MEDICAL HISTORY:  Genital herpes simplex type 2     History of PCOS     Kidney stone 12/2020    Liver finding

## 2024-09-06 NOTE — H&P PST ADULT - PROBLEM SELECTOR PLAN 1
Caller: Jeanne Shipley CHARLES    Relationship: Self    Best call back number: [phone]    Medication needed:   Requested Prescriptions     Pending Prescriptions Disp Refills   • amphetamine-dextroamphetamine (ADDERALL) 20 MG tablet 90 tablet 0     Sig: Take 1 tablet by mouth 3 (Three) Times a Day.   • diazePAM (VALIUM) 10 MG tablet 60 tablet 0     Sig: Take 1 tablet by mouth 2 (Two) Times a Day As Needed for Anxiety.       When do you need the refill by: 08/01/21    What additional details did the patient provide when requesting the medication: PATIENT SAID SHE THINKS IS DUE FOR REFILL ON 08/02/21 BUT SHE KNOWS DR. RUBIO IS OUT OF THE OFFICE ON MONDAYS, SO SHE IS WANTING TO SEE IF IT CAN BE PICKED UP Sunday, 08/01/21    Does the patient have less than a 3 day supply:  [x] Yes  [] No    What is the patient's preferred pharmacy: [id number] - [place], [place] - [address] [facility] [phone] [facility] [phone] FX                Dilation and Curettage Hysteroscopy with Suction by Dr. Hooper on 9/18/2024.

## 2024-09-06 NOTE — H&P PST ADULT - PROBLEM SELECTOR PLAN 2
Labs CBC, HCG and T&S  No Medical clearance necessary  Pre op and Hibiclens instructions reviewed and given.   Take routine AM med with sip of water.   Instructed to hold and/or avoid other NSAIDs and OTC supplements. Tylenol is ok. Verbalized understanding Labs CBC, HCG and T&S  Medical clearance per surgeon  Pre op and Hibiclens instructions reviewed and given.   Take routine AM med with sip of water.   Instructed to hold and/or avoid other NSAIDs and OTC supplements. Tylenol is ok. Verbalized understanding

## 2024-09-06 NOTE — H&P PST ADULT - NSICDXPROCEDURE_GEN_ALL_CORE_FT
PROCEDURES:  Hysteroscopy with biopsy of endometrium 06-Sep-2024 11:30:09 & Polypc w/wo D&C Marta Ansari

## 2024-09-06 NOTE — H&P PST ADULT - HISTORY OF PRESENT ILLNESS
28 y/o female with PMH of HSV2 (Valtrex for suppressive therapy), Liver Lesion (on 2020 CT Angio her CT abdomen negative for lesion), PCOS, and Kidney Stones & SHx Cystoscopy with Ureteral Stent, Lithotripsy, and Tonsillectomy for PST having Dilation and Curettage Hysteroscopy with Suction by Dr. Hooper on 9/18/2024.  She reports excessive and heavy vaginal bleeding since June 2024.  States menstrual bleeding can last up to 6 weeks.  Was seen by provider and recommended for the elective procedure.

## 2024-09-17 ENCOUNTER — TRANSCRIPTION ENCOUNTER (OUTPATIENT)
Age: 29
End: 2024-09-17

## 2024-09-17 RX ORDER — OXYCODONE HYDROCHLORIDE 5 MG/1
5 TABLET ORAL ONCE
Refills: 0 | Status: DISCONTINUED | OUTPATIENT
Start: 2024-09-18 | End: 2024-09-18

## 2024-09-17 RX ORDER — HYDROMORPHONE HYDROCHLORIDE 2 MG/1
0.5 TABLET ORAL ONCE
Refills: 0 | Status: DISCONTINUED | OUTPATIENT
Start: 2024-09-18 | End: 2024-09-18

## 2024-09-17 RX ORDER — ONDANSETRON 2 MG/ML
4 INJECTION, SOLUTION INTRAMUSCULAR; INTRAVENOUS ONCE
Refills: 0 | Status: DISCONTINUED | OUTPATIENT
Start: 2024-09-18 | End: 2024-09-18

## 2024-09-17 NOTE — ASU PATIENT PROFILE, ADULT - FALL HARM RISK - UNIVERSAL INTERVENTIONS
Bed in lowest position, wheels locked, appropriate side rails in place/Call bell, personal items and telephone in reach/Instruct patient to call for assistance before getting out of bed or chair/Non-slip footwear when patient is out of bed/[place] to call system/Physically safe environment - no spills, clutter or unnecessary equipment/Purposeful Proactive Rounding/Room/bathroom lighting operational, light cord in reach

## 2024-09-17 NOTE — ASU PATIENT PROFILE, ADULT - VISION (WITH CORRECTIVE LENSES IF THE PATIENT USUALLY WEARS THEM):
Wear glasses/Severely impaired: cannot locate objects without hearing or touching them or patient nonresponsive.

## 2024-09-18 ENCOUNTER — OUTPATIENT (OUTPATIENT)
Dept: OUTPATIENT SERVICES | Facility: HOSPITAL | Age: 29
LOS: 1 days | End: 2024-09-18
Payer: COMMERCIAL

## 2024-09-18 ENCOUNTER — TRANSCRIPTION ENCOUNTER (OUTPATIENT)
Age: 29
End: 2024-09-18

## 2024-09-18 VITALS
TEMPERATURE: 98 F | HEART RATE: 84 BPM | DIASTOLIC BLOOD PRESSURE: 57 MMHG | SYSTOLIC BLOOD PRESSURE: 92 MMHG | OXYGEN SATURATION: 96 % | RESPIRATION RATE: 14 BRPM

## 2024-09-18 VITALS
SYSTOLIC BLOOD PRESSURE: 102 MMHG | OXYGEN SATURATION: 98 % | RESPIRATION RATE: 18 BRPM | HEART RATE: 70 BPM | DIASTOLIC BLOOD PRESSURE: 56 MMHG

## 2024-09-18 DIAGNOSIS — Z98.890 OTHER SPECIFIED POSTPROCEDURAL STATES: Chronic | ICD-10-CM

## 2024-09-18 DIAGNOSIS — N93.9 ABNORMAL UTERINE AND VAGINAL BLEEDING, UNSPECIFIED: ICD-10-CM

## 2024-09-18 DIAGNOSIS — Z90.89 ACQUIRED ABSENCE OF OTHER ORGANS: Chronic | ICD-10-CM

## 2024-09-18 DIAGNOSIS — Z01.818 ENCOUNTER FOR OTHER PREPROCEDURAL EXAMINATION: ICD-10-CM

## 2024-09-18 LAB
ABO RH CONFIRMATION: SIGNIFICANT CHANGE UP
HCG UR QL: NEGATIVE — SIGNIFICANT CHANGE UP

## 2024-09-18 PROCEDURE — 88305 TISSUE EXAM BY PATHOLOGIST: CPT | Mod: 26

## 2024-09-18 PROCEDURE — 81025 URINE PREGNANCY TEST: CPT

## 2024-09-18 PROCEDURE — 58558 HYSTEROSCOPY BIOPSY: CPT

## 2024-09-18 PROCEDURE — 36415 COLL VENOUS BLD VENIPUNCTURE: CPT

## 2024-09-18 PROCEDURE — 88305 TISSUE EXAM BY PATHOLOGIST: CPT

## 2024-09-18 RX ORDER — VALACYCLOVIR 500 MG/1
1 TABLET, FILM COATED ORAL
Refills: 0 | DISCHARGE

## 2024-09-18 RX ORDER — ACETAMINOPHEN 325 MG/1
975 TABLET ORAL ONCE
Refills: 0 | Status: COMPLETED | OUTPATIENT
Start: 2024-09-18 | End: 2024-09-18

## 2024-09-18 RX ADMIN — ACETAMINOPHEN 975 MILLIGRAM(S): 325 TABLET ORAL at 08:51

## 2024-09-18 RX ADMIN — Medication 75 MILLILITER(S): at 08:51

## 2024-09-18 NOTE — ASU DISCHARGE PLAN (ADULT/PEDIATRIC) - PROCEDURE
Dilation and Curettage Hysteroscopy with Suction Dilation and Curettage Hysteroscopy Polypectomy with Suction

## 2024-09-18 NOTE — BRIEF OPERATIVE NOTE - NSICDXBRIEFPREOP_GEN_ALL_CORE_FT
PRE-OP DIAGNOSIS:  Abnormal uterine bleeding due to endometrial polyp 18-Sep-2024 10:03:23  Delilah Hooper

## 2024-09-18 NOTE — BRIEF OPERATIVE NOTE - NSICDXBRIEFPROCEDURE_GEN_ALL_CORE_FT
PROCEDURES:  Hysteroscopy with dilation of cervix and curettage procedure using suction 18-Sep-2024 10:03:05  Delilah Hooper  Polypectomy, uterus 18-Sep-2024 10:03:10  Delilah Hooper

## 2024-09-18 NOTE — ASU DISCHARGE PLAN (ADULT/PEDIATRIC) - NS MD DC FALL RISK RISK
For information on Fall & Injury Prevention, visit: [email or web address] OR  [email or web address] OR  https://www.cdc.gov/steadi/patient.html

## 2024-09-18 NOTE — BRIEF OPERATIVE NOTE - NSICDXBRIEFPOSTOP_GEN_ALL_CORE_FT
POST-OP DIAGNOSIS:  Abnormal uterine bleeding due to endometrial polyp 18-Sep-2024 10:03:38  Delilah Hooper

## 2024-09-18 NOTE — ASU DISCHARGE PLAN (ADULT/PEDIATRIC) - CARE PROVIDER_API CALL
Delilah Hooper  Obstetrics and Gynecology  [address] [place] [id number]  Phone: [phone]  Fax: [phone]  Follow Up Time:

## 2024-09-18 NOTE — ASU PREOP CHECKLIST - STERILIZATION AFFIRMATION
Transition of Care outreached postponed for 30 days due to patients discharge to long term acute care facility. [facility] 7/23-8/11/21  m respiratory Fx.  D/C [facility] f/u 30d n/a

## 2024-09-19 LAB — SURGICAL PATHOLOGY STUDY: SIGNIFICANT CHANGE UP

## (undated) DEVICE — DRAPE LIGHT HANDLE COVER (GREEN)

## (undated) DEVICE — SOL INJ LR 1000ML

## (undated) DEVICE — TUBING TUR 2 PRONG

## (undated) DEVICE — PREP TRAY DRY SKIN PREP SCRUB

## (undated) DEVICE — SOL IRR POUR NS 0.9% 1000ML

## (undated) DEVICE — GLV 6.5 PROTEXIS (WHITE)

## (undated) DEVICE — TUBING GYRUS ACMI COLLECTION SET 6FT

## (undated) DEVICE — DRAPE TOWEL BLUE 17" X 24"

## (undated) DEVICE — PACK LITHOTOMY

## (undated) DEVICE — VENODYNE/SCD SLEEVE CALF MEDIUM

## (undated) DEVICE — PSP-SCD MACHINE: Type: DURABLE MEDICAL EQUIPMENT

## (undated) DEVICE — VACUUM CURETTE BERKLEY OLYMPUS CURVED 7MM

## (undated) DEVICE — GLV 8 PROTEXIS (WHITE)

## (undated) DEVICE — SOL IRR POUR H2O 1000ML